# Patient Record
Sex: FEMALE | Race: WHITE | NOT HISPANIC OR LATINO
[De-identification: names, ages, dates, MRNs, and addresses within clinical notes are randomized per-mention and may not be internally consistent; named-entity substitution may affect disease eponyms.]

---

## 2017-08-29 ENCOUNTER — OTHER (OUTPATIENT)
Age: 57
End: 2017-08-29

## 2017-09-05 ENCOUNTER — APPOINTMENT (OUTPATIENT)
Dept: ORTHOPEDIC SURGERY | Facility: CLINIC | Age: 57
End: 2017-09-05
Payer: COMMERCIAL

## 2017-09-05 VITALS
DIASTOLIC BLOOD PRESSURE: 77 MMHG | HEART RATE: 54 BPM | HEIGHT: 69 IN | BODY MASS INDEX: 28.14 KG/M2 | SYSTOLIC BLOOD PRESSURE: 114 MMHG | WEIGHT: 190 LBS

## 2017-09-05 DIAGNOSIS — Z87.39 PERSONAL HISTORY OF OTHER DISEASES OF THE MUSCULOSKELETAL SYSTEM AND CONNECTIVE TISSUE: ICD-10-CM

## 2017-09-05 DIAGNOSIS — Z82.61 FAMILY HISTORY OF ARTHRITIS: ICD-10-CM

## 2017-09-05 DIAGNOSIS — M23.92 UNSPECIFIED INTERNAL DERANGEMENT OF LEFT KNEE: ICD-10-CM

## 2017-09-05 DIAGNOSIS — Z78.9 OTHER SPECIFIED HEALTH STATUS: ICD-10-CM

## 2017-09-05 DIAGNOSIS — Z82.62 FAMILY HISTORY OF OSTEOPOROSIS: ICD-10-CM

## 2017-09-05 DIAGNOSIS — Z86.39 PERSONAL HISTORY OF OTHER ENDOCRINE, NUTRITIONAL AND METABOLIC DISEASE: ICD-10-CM

## 2017-09-05 PROCEDURE — 73564 X-RAY EXAM KNEE 4 OR MORE: CPT | Mod: LT

## 2017-09-05 PROCEDURE — 99204 OFFICE O/P NEW MOD 45 MIN: CPT

## 2017-09-06 ENCOUNTER — OTHER (OUTPATIENT)
Age: 57
End: 2017-09-06

## 2017-09-12 ENCOUNTER — APPOINTMENT (OUTPATIENT)
Dept: ORTHOPEDIC SURGERY | Facility: CLINIC | Age: 57
End: 2017-09-12
Payer: COMMERCIAL

## 2017-09-12 VITALS
DIASTOLIC BLOOD PRESSURE: 71 MMHG | SYSTOLIC BLOOD PRESSURE: 103 MMHG | HEART RATE: 57 BPM | BODY MASS INDEX: 28.14 KG/M2 | WEIGHT: 190 LBS | HEIGHT: 69 IN

## 2017-09-12 PROCEDURE — 99213 OFFICE O/P EST LOW 20 MIN: CPT

## 2017-12-04 ENCOUNTER — APPOINTMENT (OUTPATIENT)
Dept: ORTHOPEDIC SURGERY | Facility: CLINIC | Age: 57
End: 2017-12-04
Payer: COMMERCIAL

## 2017-12-04 VITALS
WEIGHT: 190 LBS | HEIGHT: 69 IN | SYSTOLIC BLOOD PRESSURE: 107 MMHG | HEART RATE: 64 BPM | BODY MASS INDEX: 28.14 KG/M2 | DIASTOLIC BLOOD PRESSURE: 72 MMHG

## 2017-12-04 PROCEDURE — 20610 DRAIN/INJ JOINT/BURSA W/O US: CPT | Mod: LT

## 2017-12-04 PROCEDURE — 99213 OFFICE O/P EST LOW 20 MIN: CPT | Mod: 25

## 2017-12-04 RX ORDER — HYALURONATE SODIUM 20 MG/2 ML
20 SYRINGE (ML) INTRAARTICULAR
Qty: 6 | Refills: 0 | Status: DISCONTINUED | OUTPATIENT
Start: 2017-09-12 | End: 2017-12-04

## 2017-12-04 RX ORDER — NAPROXEN 500 MG/1
500 TABLET ORAL
Qty: 60 | Refills: 0 | Status: DISCONTINUED | COMMUNITY
Start: 2017-08-08 | End: 2017-12-04

## 2017-12-04 RX ORDER — CLARITHROMYCIN 500 MG/1
500 TABLET, FILM COATED ORAL
Qty: 20 | Refills: 0 | Status: DISCONTINUED | COMMUNITY
Start: 2017-04-21 | End: 2017-12-04

## 2018-02-06 ENCOUNTER — OTHER (OUTPATIENT)
Age: 58
End: 2018-02-06

## 2018-02-12 ENCOUNTER — APPOINTMENT (OUTPATIENT)
Dept: ORTHOPEDIC SURGERY | Facility: CLINIC | Age: 58
End: 2018-02-12
Payer: COMMERCIAL

## 2018-02-12 ENCOUNTER — OTHER (OUTPATIENT)
Age: 58
End: 2018-02-12

## 2018-02-12 VITALS
SYSTOLIC BLOOD PRESSURE: 122 MMHG | BODY MASS INDEX: 28.14 KG/M2 | HEIGHT: 69 IN | HEART RATE: 63 BPM | DIASTOLIC BLOOD PRESSURE: 79 MMHG | WEIGHT: 190 LBS

## 2018-02-12 DIAGNOSIS — M25.569 PAIN IN UNSPECIFIED KNEE: ICD-10-CM

## 2018-02-12 PROCEDURE — 73564 X-RAY EXAM KNEE 4 OR MORE: CPT | Mod: LT

## 2018-02-12 PROCEDURE — 99213 OFFICE O/P EST LOW 20 MIN: CPT

## 2018-02-13 ENCOUNTER — FORM ENCOUNTER (OUTPATIENT)
Age: 58
End: 2018-02-13

## 2018-02-14 ENCOUNTER — OUTPATIENT (OUTPATIENT)
Dept: OUTPATIENT SERVICES | Facility: HOSPITAL | Age: 58
LOS: 1 days | End: 2018-02-14
Payer: COMMERCIAL

## 2018-02-14 ENCOUNTER — APPOINTMENT (OUTPATIENT)
Dept: MRI IMAGING | Facility: CLINIC | Age: 58
End: 2018-02-14
Payer: COMMERCIAL

## 2018-02-14 DIAGNOSIS — M23.92 UNSPECIFIED INTERNAL DERANGEMENT OF LEFT KNEE: ICD-10-CM

## 2018-02-14 PROCEDURE — 73721 MRI JNT OF LWR EXTRE W/O DYE: CPT

## 2018-02-14 PROCEDURE — 73721 MRI JNT OF LWR EXTRE W/O DYE: CPT | Mod: 26,LT

## 2018-02-27 ENCOUNTER — APPOINTMENT (OUTPATIENT)
Dept: ORTHOPEDIC SURGERY | Facility: CLINIC | Age: 58
End: 2018-02-27
Payer: COMMERCIAL

## 2018-02-27 VITALS
WEIGHT: 190 LBS | SYSTOLIC BLOOD PRESSURE: 124 MMHG | HEIGHT: 69 IN | HEART RATE: 65 BPM | BODY MASS INDEX: 28.14 KG/M2 | DIASTOLIC BLOOD PRESSURE: 76 MMHG

## 2018-02-27 DIAGNOSIS — M17.12 UNILATERAL PRIMARY OSTEOARTHRITIS, LEFT KNEE: ICD-10-CM

## 2018-02-27 PROCEDURE — 99215 OFFICE O/P EST HI 40 MIN: CPT

## 2018-06-19 ENCOUNTER — OUTPATIENT (OUTPATIENT)
Dept: OUTPATIENT SERVICES | Facility: HOSPITAL | Age: 58
LOS: 1 days | End: 2018-06-19
Payer: COMMERCIAL

## 2018-06-19 DIAGNOSIS — R00.1 BRADYCARDIA, UNSPECIFIED: ICD-10-CM

## 2018-06-19 PROCEDURE — 93018 CV STRESS TEST I&R ONLY: CPT

## 2018-06-19 PROCEDURE — 93016 CV STRESS TEST SUPVJ ONLY: CPT

## 2018-07-16 ENCOUNTER — OUTPATIENT (OUTPATIENT)
Dept: OUTPATIENT SERVICES | Facility: HOSPITAL | Age: 58
LOS: 1 days | End: 2018-07-16
Payer: COMMERCIAL

## 2018-07-16 DIAGNOSIS — R94.31 ABNORMAL ELECTROCARDIOGRAM [ECG] [EKG]: ICD-10-CM

## 2018-07-16 PROCEDURE — 93018 CV STRESS TEST I&R ONLY: CPT

## 2018-07-16 PROCEDURE — A9500: CPT

## 2018-07-16 PROCEDURE — 93016 CV STRESS TEST SUPVJ ONLY: CPT

## 2018-07-16 PROCEDURE — 93017 CV STRESS TEST TRACING ONLY: CPT

## 2018-07-16 PROCEDURE — 78452 HT MUSCLE IMAGE SPECT MULT: CPT | Mod: 26

## 2018-07-16 PROCEDURE — 78452 HT MUSCLE IMAGE SPECT MULT: CPT

## 2019-02-12 ENCOUNTER — APPOINTMENT (OUTPATIENT)
Dept: THORACIC SURGERY | Facility: CLINIC | Age: 59
End: 2019-02-12
Payer: COMMERCIAL

## 2019-02-12 VITALS
BODY MASS INDEX: 28.14 KG/M2 | RESPIRATION RATE: 16 BRPM | WEIGHT: 190 LBS | OXYGEN SATURATION: 96 % | HEART RATE: 56 BPM | DIASTOLIC BLOOD PRESSURE: 69 MMHG | SYSTOLIC BLOOD PRESSURE: 124 MMHG | HEIGHT: 69 IN

## 2019-02-12 DIAGNOSIS — Z86.79 PERSONAL HISTORY OF OTHER DISEASES OF THE CIRCULATORY SYSTEM: ICD-10-CM

## 2019-02-12 PROCEDURE — 99205 OFFICE O/P NEW HI 60 MIN: CPT

## 2019-02-13 PROBLEM — Z86.79 HISTORY OF HYPERTENSION: Status: RESOLVED | Noted: 2019-02-13 | Resolved: 2019-02-13

## 2019-03-11 ENCOUNTER — OTHER (OUTPATIENT)
Age: 59
End: 2019-03-11

## 2019-03-13 ENCOUNTER — APPOINTMENT (OUTPATIENT)
Dept: GASTROENTEROLOGY | Facility: HOSPITAL | Age: 59
End: 2019-03-13

## 2019-03-13 ENCOUNTER — OUTPATIENT (OUTPATIENT)
Dept: OUTPATIENT SERVICES | Facility: HOSPITAL | Age: 59
LOS: 1 days | Discharge: HOME | End: 2019-03-13
Payer: COMMERCIAL

## 2019-03-13 DIAGNOSIS — K44.9 DIAPHRAGMATIC HERNIA WITHOUT OBSTRUCTION OR GANGRENE: ICD-10-CM

## 2019-03-13 PROCEDURE — 91037 ESOPH IMPED FUNCTION TEST: CPT | Mod: 26,GC

## 2019-03-13 PROCEDURE — 91010 ESOPHAGUS MOTILITY STUDY: CPT | Mod: 26,GC

## 2019-03-24 ENCOUNTER — FORM ENCOUNTER (OUTPATIENT)
Age: 59
End: 2019-03-24

## 2019-03-25 ENCOUNTER — OUTPATIENT (OUTPATIENT)
Dept: OUTPATIENT SERVICES | Facility: HOSPITAL | Age: 59
LOS: 1 days | End: 2019-03-25
Payer: COMMERCIAL

## 2019-03-25 ENCOUNTER — OUTPATIENT (OUTPATIENT)
Dept: OUTPATIENT SERVICES | Facility: HOSPITAL | Age: 59
LOS: 1 days | End: 2019-03-25

## 2019-03-25 ENCOUNTER — APPOINTMENT (OUTPATIENT)
Dept: RADIOLOGY | Facility: HOSPITAL | Age: 59
End: 2019-03-25
Payer: COMMERCIAL

## 2019-03-25 ENCOUNTER — APPOINTMENT (OUTPATIENT)
Dept: CT IMAGING | Facility: IMAGING CENTER | Age: 59
End: 2019-03-25

## 2019-03-25 VITALS
DIASTOLIC BLOOD PRESSURE: 70 MMHG | TEMPERATURE: 98 F | SYSTOLIC BLOOD PRESSURE: 126 MMHG | RESPIRATION RATE: 18 BRPM | HEART RATE: 62 BPM | HEIGHT: 67 IN | WEIGHT: 201.06 LBS

## 2019-03-25 DIAGNOSIS — Z98.890 OTHER SPECIFIED POSTPROCEDURAL STATES: Chronic | ICD-10-CM

## 2019-03-25 DIAGNOSIS — K44.9 DIAPHRAGMATIC HERNIA WITHOUT OBSTRUCTION OR GANGRENE: ICD-10-CM

## 2019-03-25 DIAGNOSIS — Z90.49 ACQUIRED ABSENCE OF OTHER SPECIFIED PARTS OF DIGESTIVE TRACT: Chronic | ICD-10-CM

## 2019-03-25 DIAGNOSIS — K21.9 GASTRO-ESOPHAGEAL REFLUX DISEASE WITHOUT ESOPHAGITIS: Chronic | ICD-10-CM

## 2019-03-25 LAB
ANION GAP SERPL CALC-SCNC: 13 MMO/L — SIGNIFICANT CHANGE UP (ref 7–14)
BLD GP AB SCN SERPL QL: NEGATIVE — SIGNIFICANT CHANGE UP
BUN SERPL-MCNC: 14 MG/DL — SIGNIFICANT CHANGE UP (ref 7–23)
CALCIUM SERPL-MCNC: 9.3 MG/DL — SIGNIFICANT CHANGE UP (ref 8.4–10.5)
CHLORIDE SERPL-SCNC: 105 MMOL/L — SIGNIFICANT CHANGE UP (ref 98–107)
CO2 SERPL-SCNC: 24 MMOL/L — SIGNIFICANT CHANGE UP (ref 22–31)
CREAT SERPL-MCNC: 0.71 MG/DL — SIGNIFICANT CHANGE UP (ref 0.5–1.3)
GLUCOSE SERPL-MCNC: 110 MG/DL — HIGH (ref 70–99)
HCT VFR BLD CALC: 39.2 % — SIGNIFICANT CHANGE UP (ref 34.5–45)
HGB BLD-MCNC: 12.9 G/DL — SIGNIFICANT CHANGE UP (ref 11.5–15.5)
MCHC RBC-ENTMCNC: 30.1 PG — SIGNIFICANT CHANGE UP (ref 27–34)
MCHC RBC-ENTMCNC: 32.9 % — SIGNIFICANT CHANGE UP (ref 32–36)
MCV RBC AUTO: 91.6 FL — SIGNIFICANT CHANGE UP (ref 80–100)
NRBC # FLD: 0 K/UL — SIGNIFICANT CHANGE UP (ref 0–0)
PLATELET # BLD AUTO: 287 K/UL — SIGNIFICANT CHANGE UP (ref 150–400)
PMV BLD: 11.6 FL — SIGNIFICANT CHANGE UP (ref 7–13)
POTASSIUM SERPL-MCNC: 3.8 MMOL/L — SIGNIFICANT CHANGE UP (ref 3.5–5.3)
POTASSIUM SERPL-SCNC: 3.8 MMOL/L — SIGNIFICANT CHANGE UP (ref 3.5–5.3)
RBC # BLD: 4.28 M/UL — SIGNIFICANT CHANGE UP (ref 3.8–5.2)
RBC # FLD: 13 % — SIGNIFICANT CHANGE UP (ref 10.3–14.5)
RH IG SCN BLD-IMP: POSITIVE — SIGNIFICANT CHANGE UP
SODIUM SERPL-SCNC: 142 MMOL/L — SIGNIFICANT CHANGE UP (ref 135–145)
WBC # BLD: 6.78 K/UL — SIGNIFICANT CHANGE UP (ref 3.8–10.5)
WBC # FLD AUTO: 6.78 K/UL — SIGNIFICANT CHANGE UP (ref 3.8–10.5)

## 2019-03-25 PROCEDURE — 71250 CT THORAX DX C-: CPT

## 2019-03-25 PROCEDURE — 71250 CT THORAX DX C-: CPT | Mod: 26

## 2019-03-25 PROCEDURE — 93010 ELECTROCARDIOGRAM REPORT: CPT

## 2019-03-25 PROCEDURE — 74220 X-RAY XM ESOPHAGUS 1CNTRST: CPT | Mod: 26

## 2019-03-25 RX ORDER — SODIUM CHLORIDE 9 MG/ML
3 INJECTION INTRAMUSCULAR; INTRAVENOUS; SUBCUTANEOUS EVERY 8 HOURS
Qty: 0 | Refills: 0 | Status: DISCONTINUED | OUTPATIENT
Start: 2019-04-08 | End: 2019-04-09

## 2019-03-25 NOTE — H&P PST ADULT - NEGATIVE CARDIOVASCULAR SYMPTOMS
no dyspnea on exertion/no palpitations/no claudication/no orthopnea/no peripheral edema/no paroxysmal nocturnal dyspnea/no chest pain

## 2019-03-25 NOTE — H&P PST ADULT - RS GEN PE MLT RESP DETAILS PC
no rales/no subcutaneous emphysema/breath sounds equal/good air movement/clear to auscultation bilaterally/respirations non-labored/airway patent/no intercostal retractions/no chest wall tenderness/no rhonchi/no wheezes

## 2019-03-25 NOTE — H&P PST ADULT - NSICDXPASTSURGICALHX_GEN_ALL_CORE_FT
PAST SURGICAL HISTORY:  H/O repair of right rotator cuff 2002    Hiatal hernia with GERD " Endoscopic lesion of stomach"; 03/05/19    History of appendectomy 1991    History of bunionectomy right; 2006    Status post wrist surgery right;  2003

## 2019-03-25 NOTE — H&P PST ADULT - NSANTHOSAYNRD_GEN_A_CORE
No. XAVI screening performed.  STOP BANG Legend: 0-2 = LOW Risk; 3-4 = INTERMEDIATE Risk; 5-8 = HIGH Risk

## 2019-03-25 NOTE — H&P PST ADULT - NSICDXPASTMEDICALHX_GEN_ALL_CORE_FT
PAST MEDICAL HISTORY:  Dyslipidemia     GERD (gastroesophageal reflux disease)     Hypertension     Hypothyroidism     Obesity     Osteopenia

## 2019-03-25 NOTE — H&P PST ADULT - NEGATIVE GASTROINTESTINAL SYMPTOMS
no change in bowel habits/no steatorrhea/no flatulence/no melena/no nausea/no vomiting/no constipation/no hiccoughs/no jaundice/no diarrhea

## 2019-03-25 NOTE — H&P PST ADULT - NEGATIVE BREAST SYMPTOMS
no breast tenderness L/no breast tenderness R/no nipple discharge L/no breast lump R/no breast lump L

## 2019-03-25 NOTE — H&P PST ADULT - HISTORY OF PRESENT ILLNESS
60 y/o  female with PMHx: Hypothyroidism, HTN, Dyslipidemia, Osteopenia presents to PST for pre op evaluation   h/o severe acid reflux, worsened x 1 1/2 year. S/P Endoscopy 01/14/2019 "small tumor and lesion". Scheduled for upper endosopy, Robotic assisted laparoscopic hiatal hernia repair, Nissen fundoplication on 04/08/19 60 y/o  female with PMHx: Hypothyroidism, HTN, Dyslipidemia, Osteopenia, Obesity presents to PST for pre op evaluation with h/o severe acid reflux, worsened x 1 1/2 year. S/P Endoscopy 01/14/2019 "small tumor and lesion". Scheduled for upper endoscopy, Robotic assisted laparoscopic hiatal hernia repair, Nissen fundoplication on 04/08/19

## 2019-03-25 NOTE — H&P PST ADULT - NEGATIVE OPHTHALMOLOGIC SYMPTOMS
no blurred vision L/no blurred vision R/no discharge R/no pain L/no irritation L/no loss of vision R/no discharge L/no lacrimation L/no lacrimation R/no diplopia/no photophobia/no irritation R/no loss of vision L

## 2019-03-25 NOTE — H&P PST ADULT - NSICDXPROBLEM_GEN_ALL_CORE_FT
PROBLEM DIAGNOSES  Problem: Diaphragmatic hernia without obstruction or gangrene  Assessment and Plan: Scheduled for upper endoscopy, Robotic assisted laparoscopic hiatal hernia repair, Nissen fundoplication on 04/08/19. Pre op instructions, chlorhexidine gluconate soap given and explained. Pt verbalized understanding.

## 2019-04-07 ENCOUNTER — TRANSCRIPTION ENCOUNTER (OUTPATIENT)
Age: 59
End: 2019-04-07

## 2019-04-08 ENCOUNTER — RESULT REVIEW (OUTPATIENT)
Age: 59
End: 2019-04-08

## 2019-04-08 ENCOUNTER — INPATIENT (INPATIENT)
Facility: HOSPITAL | Age: 59
LOS: 0 days | Discharge: HOME | End: 2019-04-09
Attending: THORACIC SURGERY (CARDIOTHORACIC VASCULAR SURGERY) | Admitting: THORACIC SURGERY (CARDIOTHORACIC VASCULAR SURGERY)
Payer: COMMERCIAL

## 2019-04-08 ENCOUNTER — APPOINTMENT (OUTPATIENT)
Dept: THORACIC SURGERY | Facility: HOSPITAL | Age: 59
End: 2019-04-08

## 2019-04-08 ENCOUNTER — TRANSCRIPTION ENCOUNTER (OUTPATIENT)
Age: 59
End: 2019-04-08

## 2019-04-08 VITALS
SYSTOLIC BLOOD PRESSURE: 99 MMHG | HEART RATE: 98 BPM | RESPIRATION RATE: 16 BRPM | WEIGHT: 201.06 LBS | HEIGHT: 67 IN | TEMPERATURE: 98 F | OXYGEN SATURATION: 96 % | DIASTOLIC BLOOD PRESSURE: 65 MMHG

## 2019-04-08 DIAGNOSIS — Z98.890 OTHER SPECIFIED POSTPROCEDURAL STATES: Chronic | ICD-10-CM

## 2019-04-08 DIAGNOSIS — Z90.49 ACQUIRED ABSENCE OF OTHER SPECIFIED PARTS OF DIGESTIVE TRACT: Chronic | ICD-10-CM

## 2019-04-08 DIAGNOSIS — K21.9 GASTRO-ESOPHAGEAL REFLUX DISEASE WITHOUT ESOPHAGITIS: Chronic | ICD-10-CM

## 2019-04-08 DIAGNOSIS — K44.9 DIAPHRAGMATIC HERNIA WITHOUT OBSTRUCTION OR GANGRENE: ICD-10-CM

## 2019-04-08 LAB — RH IG SCN BLD-IMP: POSITIVE — SIGNIFICANT CHANGE UP

## 2019-04-08 PROCEDURE — 71045 X-RAY EXAM CHEST 1 VIEW: CPT | Mod: 26

## 2019-04-08 PROCEDURE — 88302 TISSUE EXAM BY PATHOLOGIST: CPT | Mod: 26

## 2019-04-08 RX ORDER — ONDANSETRON 8 MG/1
4 TABLET, FILM COATED ORAL ONCE
Qty: 0 | Refills: 0 | Status: DISCONTINUED | OUTPATIENT
Start: 2019-04-08 | End: 2019-04-08

## 2019-04-08 RX ORDER — NALBUPHINE HYDROCHLORIDE 10 MG/ML
2.5 INJECTION, SOLUTION INTRAMUSCULAR; INTRAVENOUS; SUBCUTANEOUS EVERY 6 HOURS
Qty: 0 | Refills: 0 | Status: DISCONTINUED | OUTPATIENT
Start: 2019-04-08 | End: 2019-04-09

## 2019-04-08 RX ORDER — SODIUM CHLORIDE 9 MG/ML
1000 INJECTION, SOLUTION INTRAVENOUS
Qty: 0 | Refills: 0 | Status: DISCONTINUED | OUTPATIENT
Start: 2019-04-08 | End: 2019-04-09

## 2019-04-08 RX ORDER — HYDROMORPHONE HYDROCHLORIDE 2 MG/ML
0.5 INJECTION INTRAMUSCULAR; INTRAVENOUS; SUBCUTANEOUS
Qty: 0 | Refills: 0 | Status: DISCONTINUED | OUTPATIENT
Start: 2019-04-08 | End: 2019-04-08

## 2019-04-08 RX ORDER — ONDANSETRON 8 MG/1
4 TABLET, FILM COATED ORAL EVERY 6 HOURS
Qty: 0 | Refills: 0 | Status: DISCONTINUED | OUTPATIENT
Start: 2019-04-08 | End: 2019-04-09

## 2019-04-08 RX ORDER — HYDROMORPHONE HYDROCHLORIDE 2 MG/ML
30 INJECTION INTRAMUSCULAR; INTRAVENOUS; SUBCUTANEOUS
Qty: 0 | Refills: 0 | Status: DISCONTINUED | OUTPATIENT
Start: 2019-04-08 | End: 2019-04-09

## 2019-04-08 RX ORDER — SODIUM CHLORIDE 9 MG/ML
1000 INJECTION, SOLUTION INTRAVENOUS
Qty: 0 | Refills: 0 | Status: DISCONTINUED | OUTPATIENT
Start: 2019-04-08 | End: 2019-04-08

## 2019-04-08 RX ORDER — NALOXONE HYDROCHLORIDE 4 MG/.1ML
0.1 SPRAY NASAL
Qty: 0 | Refills: 0 | Status: DISCONTINUED | OUTPATIENT
Start: 2019-04-08 | End: 2019-04-09

## 2019-04-08 RX ORDER — HYDROMORPHONE HYDROCHLORIDE 2 MG/ML
1 INJECTION INTRAMUSCULAR; INTRAVENOUS; SUBCUTANEOUS
Qty: 0 | Refills: 0 | Status: DISCONTINUED | OUTPATIENT
Start: 2019-04-08 | End: 2019-04-08

## 2019-04-08 RX ORDER — HYDROMORPHONE HYDROCHLORIDE 2 MG/ML
0.5 INJECTION INTRAMUSCULAR; INTRAVENOUS; SUBCUTANEOUS
Qty: 0 | Refills: 0 | Status: DISCONTINUED | OUTPATIENT
Start: 2019-04-08 | End: 2019-04-09

## 2019-04-08 RX ORDER — HEPARIN SODIUM 5000 [USP'U]/ML
5000 INJECTION INTRAVENOUS; SUBCUTANEOUS EVERY 8 HOURS
Qty: 0 | Refills: 0 | Status: DISCONTINUED | OUTPATIENT
Start: 2019-04-08 | End: 2019-04-09

## 2019-04-08 RX ORDER — HEPARIN SODIUM 5000 [USP'U]/ML
5000 INJECTION INTRAVENOUS; SUBCUTANEOUS ONCE
Qty: 0 | Refills: 0 | Status: COMPLETED | OUTPATIENT
Start: 2019-04-08 | End: 2019-04-08

## 2019-04-08 RX ADMIN — HYDROMORPHONE HYDROCHLORIDE 30 MILLILITER(S): 2 INJECTION INTRAMUSCULAR; INTRAVENOUS; SUBCUTANEOUS at 15:00

## 2019-04-08 RX ADMIN — HYDROMORPHONE HYDROCHLORIDE 30 MILLILITER(S): 2 INJECTION INTRAMUSCULAR; INTRAVENOUS; SUBCUTANEOUS at 17:31

## 2019-04-08 RX ADMIN — HEPARIN SODIUM 5000 UNIT(S): 5000 INJECTION INTRAVENOUS; SUBCUTANEOUS at 19:06

## 2019-04-08 RX ADMIN — SODIUM CHLORIDE 3 MILLILITER(S): 9 INJECTION INTRAMUSCULAR; INTRAVENOUS; SUBCUTANEOUS at 21:00

## 2019-04-08 RX ADMIN — SODIUM CHLORIDE 30 MILLILITER(S): 9 INJECTION, SOLUTION INTRAVENOUS at 10:21

## 2019-04-08 RX ADMIN — HEPARIN SODIUM 5000 UNIT(S): 5000 INJECTION INTRAVENOUS; SUBCUTANEOUS at 10:20

## 2019-04-08 RX ADMIN — HYDROMORPHONE HYDROCHLORIDE 30 MILLILITER(S): 2 INJECTION INTRAMUSCULAR; INTRAVENOUS; SUBCUTANEOUS at 19:06

## 2019-04-08 NOTE — BRIEF OPERATIVE NOTE - NSICDXBRIEFPROCEDURE_GEN_ALL_CORE_FT
PROCEDURES:  Robot-assisted repair of hiatal hernia with Nissen fundoplication using daVinci XI 08-Apr-2019 13:47:14  Marcos Bah

## 2019-04-08 NOTE — REVIEW OF SYSTEMS
[As Noted in HPI] : as noted in HPI [Abdominal Pain] : abdominal pain [Heartburn] : heartburn [Negative] : Heme/Lymph [Vomiting] : no vomiting [Constipation] : no constipation [Diarrhea] : no diarrhea

## 2019-04-08 NOTE — HISTORY OF PRESENT ILLNESS
[FreeTextEntry1] : 59 y/o female, former smoker, w/ hx of reflux, HTN, who presented to Dr. Luis Carlos Castle for severe acid reflux. \par \par EGD on 1/14/19 showed a 3 cm hiatal hernia and a small-medium sized submucosal lesion in the gastric body along the greater curvature, on EUS, it was a 12.2 x 7.6mm hypoechoic and homogenous layer IV lesion, suggestive of a GIST (no bx done). \par \par Pt is here today for CT Sx consultation referred by Dr. Luis Carlos Castle. Pt admits to acid reflux, epigastric pain,  mild dysphagia, and food regurgitation but denies nausea/vomiting, weight loss, cough, SOB or CP. Patient also reports removal of GIST with Dr. Jesus Manuel Richard scheduled on 3/5/19.

## 2019-04-08 NOTE — DATA REVIEWED
[FreeTextEntry1] : EGD on 1/14/19 showed a 3 cm hiatal hernia and a small-medium sized submucosal lesion in the gastric body along the greater curvature, on EUS, it was a 12.2 x 7.6mm hypoechoic and homogenous layer IV lesion, suggestive of a GIST (no bx done).

## 2019-04-08 NOTE — CONSULT LETTER
[Dear  ___] : Dear  [unfilled], [Consult Letter:] : I had the pleasure of evaluating your patient, [unfilled]. [( Thank you for referring [unfilled] for consultation for _____ )] : Thank you for referring [unfilled] for consultation for [unfilled] [Please see my note below.] : Please see my note below. [Consult Closing:] : Thank you very much for allowing me to participate in the care of this patient.  If you have any questions, please do not hesitate to contact me. [Sincerely,] : Sincerely, [FreeTextEntry2] : Dr. Luis Carlos Castle (Ref) [FreeTextEntry3] : Rainer Simon MD, MPH \par System Director of Thoracic Surgery \par Director of Comprehensive Lung and Foregut [place] \par Professor Cardiovascular & Thoracic Surgery  \par [facility] [employer] [facility]\par

## 2019-04-08 NOTE — ASSESSMENT
[FreeTextEntry1] : 59 y/o female, former smoker, w/ hx of reflux, HTN, who presented to Dr. Luis Carlos Castle for severe acid reflux. \par \par EGD on 1/14/19 showed a 3 cm hiatal hernia and a small-medium sized submucosal lesion in the gastric body along the greater curvature, on EUS, it was a 12.2 x 7.6mm hypoechoic and homogenous layer IV lesion, suggestive of a GIST (no bx done). \par \par I have reviewed the patient's medical records and diagnostic images at time of this office consultation and have made the following recommendation:\par 1. EGD result reviewed with pt. Pt is symptomatic, I recommended EGD, laparoscopic, Robotic-assisted, hiatal hernia repair on 4/8/19. Risks and benefits and alternatives explained to patient, all questions answered, patient agreed to proceed with surgery.\par 2. Medical clearance and PST\par 3. Barium esophagram\par 4. Manometry by Dr. Lisa Song\par 5. CT chest w/o contrast to evaluate size of the hernia \par \par Written by Joceline Milian NP, acting as a scribe for Dr. Rainer Simon. \par \par The documentation recorded by the scribe accurately reflects the service I personally performed and the decisions made by me. RAINER SIMON MD\par \par ADDENDUM (REGARDING EGD FINDINGS ON 1/14/19) MADE BY ALAN MORRIS NP ON 4/8/19 AS PER DR. RAINER SIMON.\par \par \par \par

## 2019-04-09 ENCOUNTER — TRANSCRIPTION ENCOUNTER (OUTPATIENT)
Age: 59
End: 2019-04-09

## 2019-04-09 VITALS
HEART RATE: 54 BPM | OXYGEN SATURATION: 98 % | TEMPERATURE: 98 F | DIASTOLIC BLOOD PRESSURE: 70 MMHG | RESPIRATION RATE: 18 BRPM | SYSTOLIC BLOOD PRESSURE: 128 MMHG

## 2019-04-09 LAB
ANION GAP SERPL CALC-SCNC: 10 MMO/L — SIGNIFICANT CHANGE UP (ref 7–14)
BASOPHILS # BLD AUTO: 0.01 K/UL — SIGNIFICANT CHANGE UP (ref 0–0.2)
BASOPHILS NFR BLD AUTO: 0.1 % — SIGNIFICANT CHANGE UP (ref 0–2)
BUN SERPL-MCNC: 13 MG/DL — SIGNIFICANT CHANGE UP (ref 7–23)
CALCIUM SERPL-MCNC: 8.6 MG/DL — SIGNIFICANT CHANGE UP (ref 8.4–10.5)
CHLORIDE SERPL-SCNC: 104 MMOL/L — SIGNIFICANT CHANGE UP (ref 98–107)
CO2 SERPL-SCNC: 24 MMOL/L — SIGNIFICANT CHANGE UP (ref 22–31)
CREAT SERPL-MCNC: 0.7 MG/DL — SIGNIFICANT CHANGE UP (ref 0.5–1.3)
EOSINOPHIL # BLD AUTO: 0 K/UL — SIGNIFICANT CHANGE UP (ref 0–0.5)
EOSINOPHIL NFR BLD AUTO: 0 % — SIGNIFICANT CHANGE UP (ref 0–6)
GLUCOSE SERPL-MCNC: 120 MG/DL — HIGH (ref 70–99)
HCT VFR BLD CALC: 36.7 % — SIGNIFICANT CHANGE UP (ref 34.5–45)
HGB BLD-MCNC: 11.8 G/DL — SIGNIFICANT CHANGE UP (ref 11.5–15.5)
IMM GRANULOCYTES NFR BLD AUTO: 0.4 % — SIGNIFICANT CHANGE UP (ref 0–1.5)
LYMPHOCYTES # BLD AUTO: 1.77 K/UL — SIGNIFICANT CHANGE UP (ref 1–3.3)
LYMPHOCYTES # BLD AUTO: 19.2 % — SIGNIFICANT CHANGE UP (ref 13–44)
MCHC RBC-ENTMCNC: 29.7 PG — SIGNIFICANT CHANGE UP (ref 27–34)
MCHC RBC-ENTMCNC: 32.2 % — SIGNIFICANT CHANGE UP (ref 32–36)
MCV RBC AUTO: 92.4 FL — SIGNIFICANT CHANGE UP (ref 80–100)
MONOCYTES # BLD AUTO: 0.77 K/UL — SIGNIFICANT CHANGE UP (ref 0–0.9)
MONOCYTES NFR BLD AUTO: 8.3 % — SIGNIFICANT CHANGE UP (ref 2–14)
NEUTROPHILS # BLD AUTO: 6.65 K/UL — SIGNIFICANT CHANGE UP (ref 1.8–7.4)
NEUTROPHILS NFR BLD AUTO: 72 % — SIGNIFICANT CHANGE UP (ref 43–77)
NRBC # FLD: 0 K/UL — SIGNIFICANT CHANGE UP (ref 0–0)
PLATELET # BLD AUTO: 191 K/UL — SIGNIFICANT CHANGE UP (ref 150–400)
PMV BLD: 11.5 FL — SIGNIFICANT CHANGE UP (ref 7–13)
POTASSIUM SERPL-MCNC: 3.9 MMOL/L — SIGNIFICANT CHANGE UP (ref 3.5–5.3)
POTASSIUM SERPL-SCNC: 3.9 MMOL/L — SIGNIFICANT CHANGE UP (ref 3.5–5.3)
RBC # BLD: 3.97 M/UL — SIGNIFICANT CHANGE UP (ref 3.8–5.2)
RBC # FLD: 13.2 % — SIGNIFICANT CHANGE UP (ref 10.3–14.5)
SODIUM SERPL-SCNC: 138 MMOL/L — SIGNIFICANT CHANGE UP (ref 135–145)
WBC # BLD: 9.24 K/UL — SIGNIFICANT CHANGE UP (ref 3.8–10.5)
WBC # FLD AUTO: 9.24 K/UL — SIGNIFICANT CHANGE UP (ref 3.8–10.5)

## 2019-04-09 PROCEDURE — 74220 X-RAY XM ESOPHAGUS 1CNTRST: CPT | Mod: 26

## 2019-04-09 PROCEDURE — 99238 HOSP IP/OBS DSCHRG MGMT 30/<: CPT

## 2019-04-09 PROCEDURE — 71045 X-RAY EXAM CHEST 1 VIEW: CPT | Mod: 26

## 2019-04-09 RX ORDER — ACETAMINOPHEN 500 MG
650 TABLET ORAL EVERY 6 HOURS
Qty: 0 | Refills: 0 | Status: DISCONTINUED | OUTPATIENT
Start: 2019-04-09 | End: 2019-04-09

## 2019-04-09 RX ORDER — OXYCODONE HYDROCHLORIDE 5 MG/1
5 TABLET ORAL EVERY 4 HOURS
Qty: 0 | Refills: 0 | Status: DISCONTINUED | OUTPATIENT
Start: 2019-04-09 | End: 2019-04-09

## 2019-04-09 RX ORDER — ACETAMINOPHEN 500 MG
1000 TABLET ORAL ONCE
Qty: 0 | Refills: 0 | Status: DISCONTINUED | OUTPATIENT
Start: 2019-04-09 | End: 2019-04-09

## 2019-04-09 RX ORDER — RANITIDINE HYDROCHLORIDE 150 MG/1
1 TABLET, FILM COATED ORAL
Qty: 0 | Refills: 0 | COMMUNITY

## 2019-04-09 RX ORDER — SIMETHICONE 80 MG/1
1 TABLET, CHEWABLE ORAL
Qty: 0 | Refills: 0 | DISCHARGE
Start: 2019-04-09

## 2019-04-09 RX ORDER — DOCUSATE SODIUM 100 MG
100 CAPSULE ORAL THREE TIMES A DAY
Qty: 0 | Refills: 0 | Status: DISCONTINUED | OUTPATIENT
Start: 2019-04-09 | End: 2019-04-09

## 2019-04-09 RX ORDER — SIMETHICONE 80 MG/1
80 TABLET, CHEWABLE ORAL DAILY
Qty: 0 | Refills: 0 | Status: DISCONTINUED | OUTPATIENT
Start: 2019-04-09 | End: 2019-04-09

## 2019-04-09 RX ORDER — OMEPRAZOLE 10 MG/1
1 CAPSULE, DELAYED RELEASE ORAL
Qty: 0 | Refills: 0 | COMMUNITY

## 2019-04-09 RX ORDER — ACETAMINOPHEN 500 MG
2 TABLET ORAL
Qty: 0 | Refills: 0 | DISCHARGE
Start: 2019-04-09

## 2019-04-09 RX ORDER — OXYCODONE HYDROCHLORIDE 5 MG/1
1 TABLET ORAL
Qty: 16 | Refills: 0
Start: 2019-04-09 | End: 2019-04-12

## 2019-04-09 RX ADMIN — HYDROMORPHONE HYDROCHLORIDE 30 MILLILITER(S): 2 INJECTION INTRAMUSCULAR; INTRAVENOUS; SUBCUTANEOUS at 07:13

## 2019-04-09 RX ADMIN — HEPARIN SODIUM 5000 UNIT(S): 5000 INJECTION INTRAVENOUS; SUBCUTANEOUS at 03:19

## 2019-04-09 RX ADMIN — HEPARIN SODIUM 5000 UNIT(S): 5000 INJECTION INTRAVENOUS; SUBCUTANEOUS at 10:00

## 2019-04-09 RX ADMIN — SODIUM CHLORIDE 3 MILLILITER(S): 9 INJECTION INTRAMUSCULAR; INTRAVENOUS; SUBCUTANEOUS at 13:04

## 2019-04-09 RX ADMIN — SODIUM CHLORIDE 3 MILLILITER(S): 9 INJECTION INTRAMUSCULAR; INTRAVENOUS; SUBCUTANEOUS at 05:00

## 2019-04-09 RX ADMIN — ONDANSETRON 4 MILLIGRAM(S): 8 TABLET, FILM COATED ORAL at 10:00

## 2019-04-09 NOTE — DISCHARGE NOTE PROVIDER - CARE PROVIDER_API CALL
Rainer Simon (MD)  Surgery; Thoracic Surgery  [address] [place] [address] [place] [address]  Phone: [phone]  Fax: [phone]  Follow Up Time:     Garth Costa  Phone: [phone]  Fax: (   )    -  Follow Up Time:

## 2019-04-09 NOTE — DISCHARGE NOTE PROVIDER - NSDCFUADDAPPT_GEN_ALL_CORE_FT
See Dr Simon in 2 weeks- call for an appointment. Bring a new Chest X-ray when you come.  See your PCP as well.  Keep wounds clean and dry and monitor for redness, pus, fever.  If noted, call Dr Simon

## 2019-04-09 NOTE — PROVIDER CONTACT NOTE (OTHER) - ASSESSMENT
Pt. A&Ox4.  Asymptomatic.  /64.  Able to ambulate with assistance.  No c/o of dizziness or lightheadedness.

## 2019-04-09 NOTE — DISCHARGE NOTE PROVIDER - NSDCCPTREATMENT_GEN_ALL_CORE_FT
PRINCIPAL PROCEDURE  Procedure: Robot-assisted repair of hiatal hernia with Nissen fundoplication using daVinci XI  Findings and Treatment:

## 2019-04-09 NOTE — DISCHARGE NOTE PROVIDER - HOSPITAL COURSE
60 y/o female with PMHx: Hypothyroidism, HTN, Dyslipidemia, Osteopenia, Obesity and severe acid reflux was diagnosed with a hiatal hernia and underwent a robotic assisted laparoscopic hiatal hernia repair, Nissen fundoplication on 04/8/19.  She was for discharge home after passing her Barium swallow and then tolerating her diet. 58 y/o female with PMHx: Hypothyroidism, HTN, Dyslipidemia, Osteopenia, Obesity and severe acid reflux was diagnosed with a hiatal hernia and underwent a robotic assisted laparoscopic hiatal hernia repair, Nissen fundoplication on 04/8/19.  On POD 1 after passing her Barium swallow she was started on a full liquid diet which she tolerated well. She was discharged home with diet instructions and follow up per Dr. Simon.

## 2019-04-09 NOTE — PROGRESS NOTE ADULT - SUBJECTIVE AND OBJECTIVE BOX
Subjective: 58 y/o female presents sitting up in bed in NAD. States "I feel okay, had a bowel movement this morning already"    Vital Signs:  Vital Signs Last 24 Hrs  T(C): 36.9 (04-09-19 @ 08:40), Max: 37.2 (04-08-19 @ 15:30)  T(F): 98.4 (04-09-19 @ 08:40), Max: 99 (04-08-19 @ 15:30)  HR: 51 (04-09-19 @ 08:40) (51 - 80)  BP: 126/64 (04-09-19 @ 08:40) (114/66 - 133/64)  RR: 18 (04-09-19 @ 04:19) (11 - 18)  SpO2: 95% (04-09-19 @ 08:40) (94% - 100%) on (O2)    Pertinent Physical Exam:  Telemetry/Alarms: Sinus nelson  General: WN/WD NAD  Neurology: Awake, nonfocal, LOZANO x 4  Eyes: Scleras clear, PERRLA/ EOMI, Gross vision intact  ENT:Gross hearing intact, grossly patent pharynx, no stridor  Neck: Neck supple, trachea midline, No JVD,   Respiratory: CTA B/L, No wheezing, rales, rhonchi  CV: RRR, S1S2, no murmurs, rubs or gallops  Abdominal: Soft, NT, ND +BS,   Extremities: No edema, + peripheral pulses  Skin: No Rashes, Hematoma, Ecchymosis  Lymphatic: No Neck, axilla, groin LAD  Psych: Oriented x 3, normal affect  Incisions: c/d/i    I&O's Summary    08 Apr 2019 07:01  -  09 Apr 2019 07:00  --------------------------------------------------------  IN: 1500 mL / OUT: 1500 mL / NET: 0 mL        Relevant labs, radiology and Medications reviewed      CXR:   < from: Xray Chest 1 View- PORTABLE-Urgent (04.08.19 @ 14:40) >    INTERPRETATION:  Subcutaenous emphysema compatible with patient's recent   hx of Nissen fundoplication.      ******PRELIMINARY REPORT******    ******PRELIMINARY REPORT******          NAUN LEIVA M.D., RADIOLOGY RESIDENT        < end of copied text >                        11.8   9.24  )-----------( 191      ( 09 Apr 2019 05:57 )             36.7     04-09    138  |  104  |  13  ----------------------------<  120<H>  3.9   |  24  |  0.70    Ca    8.6      09 Apr 2019 05:57        MEDICATIONS  (STANDING):  heparin  Injectable 5000 Unit(s) SubCutaneous every 8 hours  HYDROmorphone PCA (1 mG/mL) 30 milliLiter(s) PCA Continuous PCA Continuous  lactated ringers. 1000 milliLiter(s) (100 mL/Hr) IV Continuous <Continuous>  sodium chloride 0.9% lock flush 3 milliLiter(s) IV Push every 8 hours    MEDICATIONS  (PRN):  HYDROmorphone PCA (1 mG/mL) Rescue Clinician Bolus 0.5 milliGRAM(s) IV Push every 15 minutes PRN for Pain Scale GREATER THAN 6  nalbuphine Injectable 2.5 milliGRAM(s) IV Push every 6 hours PRN Pruritus  naloxone Injectable 0.1 milliGRAM(s) IV Push every 3 minutes PRN For ANY of the following changes in patient status:  A. RR LESS THAN 10 breaths per minute, B. Oxygen saturation LESS THAN 90%, C. Sedation score of 6  ondansetron Injectable 4 milliGRAM(s) IV Push every 6 hours PRN Nausea      Assessment  59y Female  w/ PAST MEDICAL & SURGICAL HISTORY:  GERD (gastroesophageal reflux disease)  Dyslipidemia  Osteopenia  Obesity  Hypertension  Hypothyroidism  Hiatal hernia with GERD: &quot; Endoscopic lesion of stomach&quot;; 03/05/19  History of bunionectomy: right; 2006  H/O repair of right rotator cuff: 2002  Status post wrist surgery: right;  2003  History of appendectomy: 1991  admitted with complaints of Patient is a 59y old  Female who presents with a chief complaint of Hiatal hernia (09 Apr 2019 01:37)  .  On 4/8/19, patient underwent Robot-assisted repair of hiatal hernia with Nissen fundoplication using daVinci Xi. Patient will undergo a BS exam today.     PLAN  Neuro: Pain management  Pulm: Encourage coughing, deep breathing and use of incentive spirometry.  Daily CXR.   Cardio: Monitor telemetry/alarms  GI: NPO. Continue stool softeners.  Renal: monitor urine output, supplement electrolytes as needed  Vasc: Heparin SC/SCDs for DVT prophylaxis  Heme: Stable H/H.    ID: Off antibiotics. Stable.  Therapy: OOB/ambulate  Tubes: Monitor Chest tube output  Disposition: BS exam today and Aim to D/C to home once diet advance  Discussed with Cardiothoracic Team at AM rounds.

## 2019-04-09 NOTE — DISCHARGE NOTE NURSING/CASE MANAGEMENT/SOCIAL WORK - NSDCDPATPORTLINK_GEN_ALL_CORE
You can access the [facility], offered by [facility], by registering with the following website: [email or web address]

## 2019-04-09 NOTE — PROGRESS NOTE ADULT - SUBJECTIVE AND OBJECTIVE BOX
Anesthesia Pain Management Service    SUBJECTIVE: Pt now off IV PCA without problems reported.    Therapy:	  [ X] IV PCA	   [ ] Epidural           [ ] s/p Spinal Opoid              [ ] Postpartum infusion	  [ ] Patient controlled regional anesthesia (PCRA)    [ ] prn Analgesics    Allergies    No Known Allergies    Intolerances      MEDICATIONS  (STANDING):  docusate sodium 100 milliGRAM(s) Oral three times a day  heparin  Injectable 5000 Unit(s) SubCutaneous every 8 hours  simethicone 80 milliGRAM(s) Chew daily  sodium chloride 0.9% lock flush 3 milliLiter(s) IV Push every 8 hours    MEDICATIONS  (PRN):  acetaminophen   Tablet .. 650 milliGRAM(s) Oral every 6 hours PRN Mild Pain (1 - 3)  oxyCODONE    IR 5 milliGRAM(s) Oral every 4 hours PRN Moderate Pain (4 - 6)      OBJECTIVE:   [X] No new signs     [ ] Other:    Side Effects:  [X ] None			[ ] Other:    Assessment of Catheter Site:		[ ] Intact		[ ] Other:    ASSESSMENT/PLAN  [ ] Continue current therapy    [X ] Therapy changed to:    [ ] IV PCA       [ ] Epidural     [ X] prn Analgesics     Comments: PRN Oral/IV opioids and/or non-opioid adjuvant analgesics to be used at this point.    Progress Note written now but Patient was seen earlier.

## 2019-04-09 NOTE — PROGRESS NOTE ADULT - SUBJECTIVE AND OBJECTIVE BOX
Subjective: Pt is without complaints.  She voided and is using her PCA    Vital Signs:  Vital Signs Last 24 Hrs  T(C): 36.9 (04-09-19 @ 00:11), Max: 37.2 (04-08-19 @ 15:30)  T(F): 98.4 (04-09-19 @ 00:11), Max: 99 (04-08-19 @ 15:30)  HR: 63 (04-09-19 @ 00:11) (63 - 98)  BP: 128/55 (04-09-19 @ 00:11) (99/65 - 133/64)  RR: 18 (04-09-19 @ 00:11) (11 - 18)  SpO2: 98% (04-09-19 @ 00:11) (94% - 100%) on (O2)    Telemetry/Alarms:  General: WN/WD NAD  Neurology: Awake, nonfocal, LOZANO x 4  Eyes: Scleras clear, PERRLA/ EOMI, Gross vision intact  ENT: Gross hearing intact, grossly patent pharynx, no stridor  Neck: Neck supple, trachea midline, No JVD,   Respiratory: CTA B/L, No wheezing, rales, rhonchi  CV: RRR, S1S2, no murmurs  Abdominal: Soft, NT, ND +BS, multiple laparoscopic wound dressings are clean and dry  Extremities: No edema, + peripheral pulses  Skin: No Rashes, Hematoma, Ecchymosis  Lymphatic: No Neck, axilla, groin LAD  Psych: Oriented x 3, normal affect    Relevant labs, radiology and Medications reviewed    MEDICATIONS  (STANDING):  heparin  Injectable 5000 Unit(s) SubCutaneous every 8 hours  HYDROmorphone PCA (1 mG/mL) 30 milliLiter(s) PCA Continuous PCA Continuous  lactated ringers. 1000 milliLiter(s) (100 mL/Hr) IV Continuous <Continuous>  sodium chloride 0.9% lock flush 3 milliLiter(s) IV Push every 8 hours    MEDICATIONS  (PRN):  HYDROmorphone PCA (1 mG/mL) Rescue Clinician Bolus 0.5 milliGRAM(s) IV Push every 15 minutes PRN for Pain Scale GREATER THAN 6  nalbuphine Injectable 2.5 milliGRAM(s) IV Push every 6 hours PRN Pruritus  naloxone Injectable 0.1 milliGRAM(s) IV Push every 3 minutes PRN For ANY of the following changes in patient status:  A. RR LESS THAN 10 breaths per minute, B. Oxygen saturation LESS THAN 90%, C. Sedation score of 6  ondansetron Injectable 4 milliGRAM(s) IV Push every 6 hours PRN Nausea    Pertinent Physical Exam  I&O's Summary    08 Apr 2019 07:01  -  09 Apr 2019 01:31  --------------------------------------------------------  IN: 300 mL / OUT: 900 mL / NET: -600 mL    CXR: No PTX    Assessment  Stable s/p Robo-assisted hiatal hernia reapair with Nissen fundoplication    PLAN  Neuro: Pain management with IV PCA  Pulm: Encourage coughing, deep breathing and use of incentive spirometry. Daily CXR.   Cardio: Monitor telemetry/alarms  GI: NPO with IVF until after passing a Barium swallow  Renal: monitor urine output, supplement electrolytes as needed  Vasc: Heparin SC/SCDs for DVT prophylaxis  Heme: Monitor H/H. .   ID: Off antibiotics. Stable.  Physical Therapy: OOB/ambulate  Disposition: Aim to D/C to home after passing Barium swallow and tolerating a diet    Discussed with Cardiothoracic Team at AM rounds.

## 2019-04-09 NOTE — DISCHARGE NOTE PROVIDER - INSTRUCTIONS
Clear diet, advance as instructed Please follow a clear liquid diet for two days following discharge.   After two days you may have a full liquid diet for another two days.   Following two days of full liquids you may begin a soft food diet.   Please advance your diet only as tolerated and if you cannot tolerate advancing your diet, go back to the previous diet.   Eat 3-4 small meals a day and avoid carbonated beverages and extreme temperature drinks.   Also, remember to stay up right following each meal.

## 2019-04-09 NOTE — PROGRESS NOTE ADULT - SUBJECTIVE AND OBJECTIVE BOX
Anesthesia Pain Management Service    SUBJECTIVE: Patient is doing well with IV PCA and no significant problems reported.  Patient is NPO.  Patient admits to feel dizzy and nauseous when she stood up earlier, but does not feel dizziness or nausea after pressing the IV PCA.    Pain Scale Score	At rest: 6/10 ___ 	With Activity: ___ 	[X ] Refer to charted pain scores    THERAPY:    [ ] IV PCA Morphine		[ ] 5 mg/mL	[ ] 1 mg/mL  [X ] IV PCA Hydromorphone	[ ] 5 mg/mL	[X ] 1 mg/mL  [ ] IV PCA Fentanyl		[ ] 50 micrograms/mL    Demand dose __0.2_ lockout __6_ (minutes) Continuous Rate _0__ Total: _4.2__  mg used (in past 24 hours)      MEDICATIONS  (STANDING):  acetaminophen  IVPB .. 1000 milliGRAM(s) IV Intermittent once  heparin  Injectable 5000 Unit(s) SubCutaneous every 8 hours  HYDROmorphone PCA (1 mG/mL) 30 milliLiter(s) PCA Continuous PCA Continuous  lactated ringers. 1000 milliLiter(s) (100 mL/Hr) IV Continuous <Continuous>  sodium chloride 0.9% lock flush 3 milliLiter(s) IV Push every 8 hours    MEDICATIONS  (PRN):  HYDROmorphone PCA (1 mG/mL) Rescue Clinician Bolus 0.5 milliGRAM(s) IV Push every 15 minutes PRN for Pain Scale GREATER THAN 6  nalbuphine Injectable 2.5 milliGRAM(s) IV Push every 6 hours PRN Pruritus  naloxone Injectable 0.1 milliGRAM(s) IV Push every 3 minutes PRN For ANY of the following changes in patient status:  A. RR LESS THAN 10 breaths per minute, B. Oxygen saturation LESS THAN 90%, C. Sedation score of 6  ondansetron Injectable 4 milliGRAM(s) IV Push every 6 hours PRN Nausea      OBJECTIVE:  Patient is NPO, sitting up in bed.    Sedation Score:	[ X] Alert	[ ] Drowsy 	[ ] Arousable	[ ] Asleep	[ ] Unresponsive    Side Effects:	[X ] None	[ ] Nausea	[ ] Vomiting	[ ] Pruritus  		[ ] Other:    Vital Signs Last 24 Hrs  T(C): 36.9 (09 Apr 2019 08:40), Max: 37.2 (08 Apr 2019 15:30)  T(F): 98.4 (09 Apr 2019 08:40), Max: 99 (08 Apr 2019 15:30)  HR: 51 (09 Apr 2019 08:40) (51 - 80)  BP: 126/64 (09 Apr 2019 08:40) (114/66 - 133/64)  BP(mean): 83 (08 Apr 2019 17:00) (77 - 83)  RR: 18 (09 Apr 2019 04:19) (11 - 18)  SpO2: 95% (09 Apr 2019 08:40) (94% - 100%)    ASSESSMENT/ PLAN    Therapy to  be:	[ X] Continue   [ ] Discontinued   [ ] Change to prn Analgesics    Documentation and Verification of current medications:   [X] Done	[ ] Not done, not elligible    Comments:  Continue current pain regimen.  IV Tylenol added.  Advised patient to sit at edge of bed and dangle legs x few minutes prior to standing.

## 2019-04-09 NOTE — PROGRESS NOTE ADULT - SUBJECTIVE AND OBJECTIVE BOX
ANESTHESIA POSTOP CHECK    59y Female POSTOP DAY 1    Vital Signs Last 24 Hrs  T(C): 36.9 (09 Apr 2019 11:33), Max: 37.2 (08 Apr 2019 15:30)  T(F): 98.4 (09 Apr 2019 11:33), Max: 99 (08 Apr 2019 15:30)  HR: 50 (09 Apr 2019 11:33) (50 - 80)  BP: 122/60 (09 Apr 2019 11:33) (114/66 - 133/64)  BP(mean): 83 (08 Apr 2019 17:00) (77 - 83)  RR: 18 (09 Apr 2019 11:33) (11 - 18)  SpO2: 98% (09 Apr 2019 11:33) (94% - 100%)  I&O's Summary    08 Apr 2019 07:01  -  09 Apr 2019 07:00  --------------------------------------------------------  IN: 1500 mL / OUT: 1500 mL / NET: 0 mL    09 Apr 2019 07:01  -  09 Apr 2019 12:57  --------------------------------------------------------  IN: 500 mL / OUT: 0 mL / NET: 500 mL        [X ] NO APPARENT ANESTHESIA COMPLICATIONS      Comments: complains of nausea resolving on its own

## 2019-04-09 NOTE — DISCHARGE NOTE PROVIDER - NSDCFUADDINST_GEN_ALL_CORE_FT
- Call the office if you experience any fevers, shortness of breath, chest pain or excessive or purulent drainage from the incision site, leg swelling day or night. Go to the emergency room if any of these symptoms are severe.   - Take your medications as ordered and take a stool softener if needed with the narcotic medications.  - Call Dr. Simon's office at [phone] tomorrow or the next business day to make a followup appointment.  - Please get an CXR the day before your appointment and bring it with you to your follow up appointment.

## 2019-04-09 NOTE — DISCHARGE NOTE PROVIDER - NSDCACTIVITY_GEN_ALL_CORE
Walking - Indoors allowed/Walking - Outdoors allowed/Do not drive or operate machinery/No heavy lifting/straining/Showering allowed/Stairs allowed/Sex allowed No heavy lifting/straining/Walking - Outdoors allowed/Stairs allowed/Walking - Indoors allowed/Do not make important decisions/Showering allowed/Do not drive or operate machinery

## 2019-04-09 NOTE — DISCHARGE NOTE PROVIDER - PROVIDER TOKENS
PROVIDER:[TOKEN:[[id number]]],FREE:[LAST:[Igor],FIRST:[Garth],PHONE:[phone]],FAX:[(   )    -]]

## 2019-04-11 PROBLEM — I10 ESSENTIAL (PRIMARY) HYPERTENSION: Chronic | Status: ACTIVE | Noted: 2019-03-25

## 2019-04-11 PROBLEM — E03.9 HYPOTHYROIDISM, UNSPECIFIED: Chronic | Status: ACTIVE | Noted: 2019-03-25

## 2019-04-11 PROBLEM — E66.9 OBESITY, UNSPECIFIED: Chronic | Status: ACTIVE | Noted: 2019-03-25

## 2019-04-11 PROBLEM — M85.80 OTHER SPECIFIED DISORDERS OF BONE DENSITY AND STRUCTURE, UNSPECIFIED SITE: Chronic | Status: ACTIVE | Noted: 2019-03-25

## 2019-04-11 PROBLEM — E78.5 HYPERLIPIDEMIA, UNSPECIFIED: Chronic | Status: ACTIVE | Noted: 2019-03-25

## 2019-04-22 ENCOUNTER — FORM ENCOUNTER (OUTPATIENT)
Age: 59
End: 2019-04-22

## 2019-04-23 ENCOUNTER — APPOINTMENT (OUTPATIENT)
Dept: THORACIC SURGERY | Facility: CLINIC | Age: 59
End: 2019-04-23
Payer: COMMERCIAL

## 2019-04-23 ENCOUNTER — OUTPATIENT (OUTPATIENT)
Dept: OUTPATIENT SERVICES | Facility: HOSPITAL | Age: 59
LOS: 1 days | End: 2019-04-23
Payer: COMMERCIAL

## 2019-04-23 ENCOUNTER — APPOINTMENT (OUTPATIENT)
Dept: RADIOLOGY | Facility: HOSPITAL | Age: 59
End: 2019-04-23

## 2019-04-23 VITALS
WEIGHT: 186 LBS | TEMPERATURE: 98.3 F | RESPIRATION RATE: 16 BRPM | HEART RATE: 52 BPM | DIASTOLIC BLOOD PRESSURE: 66 MMHG | BODY MASS INDEX: 27.55 KG/M2 | OXYGEN SATURATION: 97 % | SYSTOLIC BLOOD PRESSURE: 99 MMHG | HEIGHT: 69 IN

## 2019-04-23 DIAGNOSIS — Z90.49 ACQUIRED ABSENCE OF OTHER SPECIFIED PARTS OF DIGESTIVE TRACT: Chronic | ICD-10-CM

## 2019-04-23 DIAGNOSIS — Z98.890 OTHER SPECIFIED POSTPROCEDURAL STATES: Chronic | ICD-10-CM

## 2019-04-23 DIAGNOSIS — K21.9 GASTRO-ESOPHAGEAL REFLUX DISEASE WITHOUT ESOPHAGITIS: Chronic | ICD-10-CM

## 2019-04-23 DIAGNOSIS — K44.9 DIAPHRAGMATIC HERNIA WITHOUT OBSTRUCTION OR GANGRENE: ICD-10-CM

## 2019-04-23 PROCEDURE — 99024 POSTOP FOLLOW-UP VISIT: CPT

## 2019-04-23 PROCEDURE — 71046 X-RAY EXAM CHEST 2 VIEWS: CPT | Mod: 26

## 2019-06-03 ENCOUNTER — FORM ENCOUNTER (OUTPATIENT)
Age: 59
End: 2019-06-03

## 2019-06-04 ENCOUNTER — OUTPATIENT (OUTPATIENT)
Dept: OUTPATIENT SERVICES | Facility: HOSPITAL | Age: 59
LOS: 1 days | End: 2019-06-04
Payer: COMMERCIAL

## 2019-06-04 ENCOUNTER — APPOINTMENT (OUTPATIENT)
Dept: RADIOLOGY | Facility: HOSPITAL | Age: 59
End: 2019-06-04

## 2019-06-04 ENCOUNTER — APPOINTMENT (OUTPATIENT)
Dept: THORACIC SURGERY | Facility: CLINIC | Age: 59
End: 2019-06-04
Payer: COMMERCIAL

## 2019-06-04 DIAGNOSIS — Z98.890 OTHER SPECIFIED POSTPROCEDURAL STATES: Chronic | ICD-10-CM

## 2019-06-04 DIAGNOSIS — Z90.49 ACQUIRED ABSENCE OF OTHER SPECIFIED PARTS OF DIGESTIVE TRACT: Chronic | ICD-10-CM

## 2019-06-04 DIAGNOSIS — K44.9 DIAPHRAGMATIC HERNIA WITHOUT OBSTRUCTION OR GANGRENE: ICD-10-CM

## 2019-06-04 DIAGNOSIS — K21.9 GASTRO-ESOPHAGEAL REFLUX DISEASE WITHOUT ESOPHAGITIS: Chronic | ICD-10-CM

## 2019-06-04 PROCEDURE — 99024 POSTOP FOLLOW-UP VISIT: CPT

## 2019-06-04 PROCEDURE — 71046 X-RAY EXAM CHEST 2 VIEWS: CPT | Mod: 26

## 2019-06-06 VITALS
DIASTOLIC BLOOD PRESSURE: 72 MMHG | BODY MASS INDEX: 27.32 KG/M2 | OXYGEN SATURATION: 97 % | SYSTOLIC BLOOD PRESSURE: 115 MMHG | WEIGHT: 185 LBS | HEART RATE: 54 BPM | RESPIRATION RATE: 16 BRPM | TEMPERATURE: 98.2 F

## 2019-06-06 RX ORDER — OMEPRAZOLE 40 MG/1
40 CAPSULE, DELAYED RELEASE ORAL
Refills: 0 | Status: COMPLETED | COMMUNITY
End: 2019-06-06

## 2019-10-08 NOTE — ASU PATIENT PROFILE, ADULT - PT NEEDS ASSIST
Pt. brought to ED via EMS from assisted living for unwitnessed fall. Per EMS pt. found on floor in room, epistaxis at time patient was found. Pt. on anticoagulation. no

## 2019-12-17 PROBLEM — K44.9 HERNIA, HIATAL: Status: ACTIVE | Noted: 2019-02-07

## 2019-12-18 ENCOUNTER — FORM ENCOUNTER (OUTPATIENT)
Age: 59
End: 2019-12-18

## 2019-12-19 ENCOUNTER — APPOINTMENT (OUTPATIENT)
Dept: RADIOLOGY | Facility: HOSPITAL | Age: 59
End: 2019-12-19

## 2019-12-19 ENCOUNTER — OUTPATIENT (OUTPATIENT)
Dept: OUTPATIENT SERVICES | Facility: HOSPITAL | Age: 59
LOS: 1 days | End: 2019-12-19
Payer: COMMERCIAL

## 2019-12-19 ENCOUNTER — APPOINTMENT (OUTPATIENT)
Dept: THORACIC SURGERY | Facility: CLINIC | Age: 59
End: 2019-12-19
Payer: COMMERCIAL

## 2019-12-19 VITALS
TEMPERATURE: 98.6 F | DIASTOLIC BLOOD PRESSURE: 82 MMHG | HEIGHT: 68.5 IN | WEIGHT: 185 LBS | OXYGEN SATURATION: 97 % | RESPIRATION RATE: 17 BRPM | SYSTOLIC BLOOD PRESSURE: 129 MMHG | HEART RATE: 55 BPM | BODY MASS INDEX: 27.72 KG/M2

## 2019-12-19 DIAGNOSIS — Z98.890 OTHER SPECIFIED POSTPROCEDURAL STATES: Chronic | ICD-10-CM

## 2019-12-19 DIAGNOSIS — Z90.49 ACQUIRED ABSENCE OF OTHER SPECIFIED PARTS OF DIGESTIVE TRACT: Chronic | ICD-10-CM

## 2019-12-19 DIAGNOSIS — K44.9 DIAPHRAGMATIC HERNIA W/OUT OBSTRUCTION OR GANGRENE: ICD-10-CM

## 2019-12-19 DIAGNOSIS — K44.9 DIAPHRAGMATIC HERNIA WITHOUT OBSTRUCTION OR GANGRENE: ICD-10-CM

## 2019-12-19 DIAGNOSIS — K21.9 GASTRO-ESOPHAGEAL REFLUX DISEASE WITHOUT ESOPHAGITIS: Chronic | ICD-10-CM

## 2019-12-19 PROCEDURE — 71046 X-RAY EXAM CHEST 2 VIEWS: CPT | Mod: 26

## 2019-12-19 PROCEDURE — 99213 OFFICE O/P EST LOW 20 MIN: CPT

## 2019-12-20 NOTE — PHYSICAL EXAM
[Auscultation Breath Sounds / Voice Sounds] : lungs were clear to auscultation bilaterally [Heart Rate And Rhythm] : heart rate was normal and rhythm regular [Heart Sounds] : normal S1 and S2 [Heart Sounds Gallop] : no gallops [Murmurs] : no murmurs [Diminished Respiratory Excursion] : normal chest expansion [Heart Sounds Pericardial Friction Rub] : no pericardial rub [Examination Of The Chest] : the chest was normal in appearance [Chest Visual Inspection Thoracic Asymmetry] : no chest asymmetry [Abdomen Tenderness] : non-tender [Abdomen Soft] : soft [Bowel Sounds] : normal bowel sounds [Cervical Lymph Nodes Enlarged Posterior Bilaterally] : posterior cervical [Abdomen Mass (___ Cm)] : no abdominal mass palpated [Cervical Lymph Nodes Enlarged Anterior Bilaterally] : anterior cervical [Supraclavicular Lymph Nodes Enlarged Bilaterally] : supraclavicular [No CVA Tenderness] : no ~M costovertebral angle tenderness [No Spinal Tenderness] : no spinal tenderness [Nail Clubbing] : no clubbing  or cyanosis of the fingernails [Abnormal Walk] : normal gait [Musculoskeletal - Swelling] : no joint swelling seen [] : no rash [Motor Tone] : muscle strength and tone were normal [Skin Color & Pigmentation] : normal skin color and pigmentation [Skin Turgor] : normal skin turgor [Deep Tendon Reflexes (DTR)] : deep tendon reflexes were 2+ and symmetric [No Focal Deficits] : no focal deficits [Sensation] : the sensory exam was normal to light touch and pinprick [Affect] : the affect was normal [Impaired Insight] : insight and judgment were intact [Oriented To Time, Place, And Person] : oriented to person, place, and time

## 2019-12-27 NOTE — HISTORY OF PRESENT ILLNESS
[FreeTextEntry1] : 60 y/o female, former smoker, w/ hx of reflux, HTN, who presented to Dr. Luis Carlos Castle for severe acid reflux. \par \par EGD on 1/14/19 showed a 3 cm hiatal hernia and a small-medium sized submucosal lesion in the gastric body along the greater curvature, on EUS, it was a 12.2 x 7.6mm hypoechoic and homogenous layer IV lesion, suggestive of a GIST (no bx done). \par \par Manometry on 3/13/19 revealed a hiatal hernia of 2.2 cm.\par \par CT Chest on 3/25/19 revealed a small hiatal hernia and a 3.8cm Lt renal cyst.\par \par Barium Swallow on 3/25/19 revealed a small to moderate hiatal hernia and no evidence of gastroesophageal reflux\par \par Now 8 mo s/p EGD, laparoscopy, robotic assisted, repair of hiatal hernia, Nissen fundoplication on 4/8/19. Path showed fibromembranous tissue c/w hernia sac and a benign lymph node w/ germinal centers. \par \par CXR on 12/19/2019: clear, no PTX. \par \par Patient is here today for a follow up. Patient is dong well. patient denied reflux, CP, SOB, and cough. \par

## 2019-12-27 NOTE — CONSULT LETTER
[Dear  ___] : Dear  [unfilled], [Consult Letter:] : I had the pleasure of evaluating your patient, [unfilled]. [( Thank you for referring [unfilled] for consultation for _____ )] : Thank you for referring [unfilled] for consultation for [unfilled] [Please see my note below.] : Please see my note below. [Consult Closing:] : Thank you very much for allowing me to participate in the care of this patient.  If you have any questions, please do not hesitate to contact me. [Sincerely,] : Sincerely, [FreeTextEntry2] : Dr. Luis Carlos Castle (GI/Ref) \par \par  [FreeTextEntry3] : Rainer Simon MD, MPH \par System Director of Thoracic Surgery \par Director of Comprehensive Lung and Foregut [place] \par Professor Cardiovascular & Thoracic Surgery  \par [facility] [employer] [facility]\par

## 2019-12-27 NOTE — ASSESSMENT
[FreeTextEntry1] : 58 y/o female, former smoker, w/ hx of reflux, HTN, who presented to Dr. Luis Carlos Castle for severe acid reflux. \par \par Now 8 mo s/p EGD, laparoscopy, robotic assisted, repair of hiatal hernia, Nissen fundoplication on 4/8/19. Path showed fibromembranous tissue c/w hernia sac and a benign lymph node w/ germinal centers. \par \par CXR on 12/19/2019: clear, no PTX. \par \par I have reviewed the patient's medical records and diagnostic images at time of this office consultation and have made the following recommendation:\par 1. CXR reviewed. Patient is doing well. I recommended patient to RTC in 1 yr with Barium Esophagram\par \par \par I personally performed the services described in the documentation, reviewed the documentation recorded by the scribe in my presence and it accurately and completely records my words and actions.\par \par I, ZAIN GOMEZ NP and Radha Espinosa NP, am scribing for and the presence of COLIN Hammer, the following sections HISTORY OF PRESENT ILLNESS, PAST MEDICAL/FAMILY/SOCIAL HISTORY; REVIEW OF SYSTEMS; VITAL SIGNS; PHYSICAL EXAM; DISPOSITION.

## 2020-02-03 ENCOUNTER — EMERGENCY (EMERGENCY)
Facility: HOSPITAL | Age: 60
LOS: 1 days | Discharge: HOME | End: 2020-02-03
Attending: EMERGENCY MEDICINE
Payer: COMMERCIAL

## 2020-02-03 VITALS
HEIGHT: 68 IN | HEART RATE: 54 BPM | RESPIRATION RATE: 18 BRPM | OXYGEN SATURATION: 100 % | DIASTOLIC BLOOD PRESSURE: 82 MMHG | SYSTOLIC BLOOD PRESSURE: 148 MMHG | TEMPERATURE: 98 F | WEIGHT: 184.97 LBS

## 2020-02-03 DIAGNOSIS — Z98.890 OTHER SPECIFIED POSTPROCEDURAL STATES: Chronic | ICD-10-CM

## 2020-02-03 DIAGNOSIS — Z90.49 ACQUIRED ABSENCE OF OTHER SPECIFIED PARTS OF DIGESTIVE TRACT: Chronic | ICD-10-CM

## 2020-02-03 DIAGNOSIS — K21.9 GASTRO-ESOPHAGEAL REFLUX DISEASE WITHOUT ESOPHAGITIS: Chronic | ICD-10-CM

## 2020-02-03 LAB
ALBUMIN SERPL ELPH-MCNC: 4.5 G/DL — SIGNIFICANT CHANGE UP (ref 3.3–5.2)
ALP SERPL-CCNC: 68 U/L — SIGNIFICANT CHANGE UP (ref 40–120)
ALT FLD-CCNC: 15 U/L — SIGNIFICANT CHANGE UP
ANION GAP SERPL CALC-SCNC: 14 MMOL/L — SIGNIFICANT CHANGE UP (ref 5–17)
APPEARANCE UR: CLEAR — SIGNIFICANT CHANGE UP
AST SERPL-CCNC: 19 U/L — SIGNIFICANT CHANGE UP
BASOPHILS # BLD AUTO: 0.06 K/UL — SIGNIFICANT CHANGE UP (ref 0–0.2)
BASOPHILS NFR BLD AUTO: 0.6 % — SIGNIFICANT CHANGE UP (ref 0–2)
BILIRUB SERPL-MCNC: 0.7 MG/DL — SIGNIFICANT CHANGE UP (ref 0.4–2)
BILIRUB UR-MCNC: NEGATIVE — SIGNIFICANT CHANGE UP
BUN SERPL-MCNC: 19 MG/DL — SIGNIFICANT CHANGE UP (ref 8–20)
CALCIUM SERPL-MCNC: 9.3 MG/DL — SIGNIFICANT CHANGE UP (ref 8.6–10.2)
CHLORIDE SERPL-SCNC: 103 MMOL/L — SIGNIFICANT CHANGE UP (ref 98–107)
CO2 SERPL-SCNC: 25 MMOL/L — SIGNIFICANT CHANGE UP (ref 22–29)
COLOR SPEC: YELLOW — SIGNIFICANT CHANGE UP
CREAT SERPL-MCNC: 0.75 MG/DL — SIGNIFICANT CHANGE UP (ref 0.5–1.3)
DIFF PNL FLD: ABNORMAL
EOSINOPHIL # BLD AUTO: 0.09 K/UL — SIGNIFICANT CHANGE UP (ref 0–0.5)
EOSINOPHIL NFR BLD AUTO: 1 % — SIGNIFICANT CHANGE UP (ref 0–6)
EPI CELLS # UR: SIGNIFICANT CHANGE UP
GLUCOSE SERPL-MCNC: 88 MG/DL — SIGNIFICANT CHANGE UP (ref 70–99)
GLUCOSE UR QL: NEGATIVE MG/DL — SIGNIFICANT CHANGE UP
HCT VFR BLD CALC: 42.2 % — SIGNIFICANT CHANGE UP (ref 34.5–45)
HGB BLD-MCNC: 13.9 G/DL — SIGNIFICANT CHANGE UP (ref 11.5–15.5)
IMM GRANULOCYTES NFR BLD AUTO: 0.2 % — SIGNIFICANT CHANGE UP (ref 0–1.5)
KETONES UR-MCNC: NEGATIVE — SIGNIFICANT CHANGE UP
LEUKOCYTE ESTERASE UR-ACNC: NEGATIVE — SIGNIFICANT CHANGE UP
LIDOCAIN IGE QN: 32 U/L — SIGNIFICANT CHANGE UP (ref 22–51)
LYMPHOCYTES # BLD AUTO: 4.01 K/UL — HIGH (ref 1–3.3)
LYMPHOCYTES # BLD AUTO: 42.8 % — SIGNIFICANT CHANGE UP (ref 13–44)
MCHC RBC-ENTMCNC: 31 PG — SIGNIFICANT CHANGE UP (ref 27–34)
MCHC RBC-ENTMCNC: 32.9 GM/DL — SIGNIFICANT CHANGE UP (ref 32–36)
MCV RBC AUTO: 94.2 FL — SIGNIFICANT CHANGE UP (ref 80–100)
MONOCYTES # BLD AUTO: 0.68 K/UL — SIGNIFICANT CHANGE UP (ref 0–0.9)
MONOCYTES NFR BLD AUTO: 7.3 % — SIGNIFICANT CHANGE UP (ref 2–14)
NEUTROPHILS # BLD AUTO: 4.5 K/UL — SIGNIFICANT CHANGE UP (ref 1.8–7.4)
NEUTROPHILS NFR BLD AUTO: 48.1 % — SIGNIFICANT CHANGE UP (ref 43–77)
NITRITE UR-MCNC: NEGATIVE — SIGNIFICANT CHANGE UP
PH UR: 6.5 — SIGNIFICANT CHANGE UP (ref 5–8)
PLATELET # BLD AUTO: 236 K/UL — SIGNIFICANT CHANGE UP (ref 150–400)
POTASSIUM SERPL-MCNC: 4.2 MMOL/L — SIGNIFICANT CHANGE UP (ref 3.5–5.3)
POTASSIUM SERPL-SCNC: 4.2 MMOL/L — SIGNIFICANT CHANGE UP (ref 3.5–5.3)
PROT SERPL-MCNC: 7.1 G/DL — SIGNIFICANT CHANGE UP (ref 6.6–8.7)
PROT UR-MCNC: NEGATIVE MG/DL — SIGNIFICANT CHANGE UP
RBC # BLD: 4.48 M/UL — SIGNIFICANT CHANGE UP (ref 3.8–5.2)
RBC # FLD: 12.5 % — SIGNIFICANT CHANGE UP (ref 10.3–14.5)
RBC CASTS # UR COMP ASSIST: SIGNIFICANT CHANGE UP /HPF (ref 0–4)
SODIUM SERPL-SCNC: 142 MMOL/L — SIGNIFICANT CHANGE UP (ref 135–145)
SP GR SPEC: 1.01 — SIGNIFICANT CHANGE UP (ref 1.01–1.02)
TROPONIN T SERPL-MCNC: 0.02 NG/ML — SIGNIFICANT CHANGE UP (ref 0–0.06)
UROBILINOGEN FLD QL: NEGATIVE MG/DL — SIGNIFICANT CHANGE UP
WBC # BLD: 9.36 K/UL — SIGNIFICANT CHANGE UP (ref 3.8–10.5)
WBC # FLD AUTO: 9.36 K/UL — SIGNIFICANT CHANGE UP (ref 3.8–10.5)
WBC UR QL: NEGATIVE — SIGNIFICANT CHANGE UP

## 2020-02-03 PROCEDURE — 85027 COMPLETE CBC AUTOMATED: CPT

## 2020-02-03 PROCEDURE — 99284 EMERGENCY DEPT VISIT MOD MDM: CPT

## 2020-02-03 PROCEDURE — 83690 ASSAY OF LIPASE: CPT

## 2020-02-03 PROCEDURE — 71046 X-RAY EXAM CHEST 2 VIEWS: CPT | Mod: 26

## 2020-02-03 PROCEDURE — 80053 COMPREHEN METABOLIC PANEL: CPT

## 2020-02-03 PROCEDURE — 93010 ELECTROCARDIOGRAM REPORT: CPT

## 2020-02-03 PROCEDURE — 36415 COLL VENOUS BLD VENIPUNCTURE: CPT

## 2020-02-03 PROCEDURE — 71275 CT ANGIOGRAPHY CHEST: CPT

## 2020-02-03 PROCEDURE — 71275 CT ANGIOGRAPHY CHEST: CPT | Mod: 26

## 2020-02-03 PROCEDURE — 93005 ELECTROCARDIOGRAM TRACING: CPT

## 2020-02-03 PROCEDURE — 99285 EMERGENCY DEPT VISIT HI MDM: CPT

## 2020-02-03 PROCEDURE — 81001 URINALYSIS AUTO W/SCOPE: CPT

## 2020-02-03 PROCEDURE — 84484 ASSAY OF TROPONIN QUANT: CPT

## 2020-02-03 PROCEDURE — 81025 URINE PREGNANCY TEST: CPT

## 2020-02-03 PROCEDURE — 71046 X-RAY EXAM CHEST 2 VIEWS: CPT

## 2020-02-03 NOTE — ED PROVIDER NOTE - OBJECTIVE STATEMENT
Pt is a 59 y.o. F hx of HTN, GERD, hypothyroidism presenting with chest pain for one day. The pt states yesterday she had chest pain, gino Pt is a 59 y.o. F hx of HTN, GERD, hypothyroidism presenting with chest pain for one day. The pt states yesterday she had chest pain, chest pressure, shortness of breath, numbness from her shoulders to her fingers in both extremities and dysuria yesterday night. Today she has only had residual chest pressure substernally and numbness of the shoulders. Pt came to ED because urged by PMD and concerned about lasting chest pressure. Has also been having calf pain bilaterally for the last six weeks with no obvious inciting events, has a sedentary desk job, no recent travel, no recent surgery, no recent immobilization/hospitalization. Pt states at baseline she is bradycardic

## 2020-02-03 NOTE — ED PROVIDER NOTE - PMH
Dyslipidemia    GERD (gastroesophageal reflux disease)    Hypertension    Hypothyroidism    Obesity    Osteopenia

## 2020-02-03 NOTE — ED ADULT TRIAGE NOTE - CHIEF COMPLAINT QUOTE
"I went to see my doctor today b/c I had chest pain that started late last night and I had chest pressure and nausea and numbness in my hands and nausea and pain in my mid back. I thought it was indigestion but I did not feel better and I feel weak"  Pt A & Ox.4

## 2020-02-03 NOTE — ED PROVIDER NOTE - NSFOLLOWUPINSTRUCTIONS_ED_ALL_ED_FT
Follow up with your medical doctor and Cardiologist this week-call to schedule appt  Continue your medications as instructed  Return sooner for any problems    Chest Pain    Chest pain can be caused by many different conditions which may or may not be dangerous. Causes include heartburn, lung infections, heart attack, blood clot in lungs, skin infections, strain or damage to muscle, cartilage, or bones, etc. In addition to a history and physical examination, an electrocardiogram (ECG) or other lab tests may have been performed to determine the cause of your chest pain. Follow up with your primary care provider or with a cardiologist as instructed.     SEEK IMMEDIATE MEDICAL CARE IF YOU HAVE ANY OF THE FOLLOWING SYMPTOMS: worsening chest pain, coughing up blood, unexplained back/neck/jaw pain, severe abdominal pain, dizziness or lightheadedness, fainting, shortness of breath, sweaty or clammy skin, vomiting, or racing heart beat. These symptoms may represent a serious problem that is an emergency. Do not wait to see if the symptoms will go away. Get medical help right away. Call 911 and do not drive yourself to the hospital.

## 2020-02-03 NOTE — ED PROVIDER NOTE - NS ED ROS FT
Constitutional: no fever, and no chills.  Eyes: no pain, no redness, and no visual changes.  ENMT: no ear pain and no hearing problems, no nasal congestion/drainage, no dysphagia, and no throat pain, no neck pain, no stiffness  CV: no edema.  Resp: no cough  GI: no abdominal pain, no bloating, no constipation, no diarrhea, and no vomiting.  : no hematuria  MSK: no msk pain, no weakness  Skin: no jaundice, no lesions, and no rashes.  Neuro: no LOC, no headache, no sensory deficits, and no weakness.

## 2020-02-03 NOTE — ED PROVIDER NOTE - PSH
H/O repair of right rotator cuff  2002  Hiatal hernia with GERD  S/P HIATAL HERNIA REPAIR 2019  " Endoscopic lesion of stomach"; 03/05/19  History of appendectomy  1991  History of bunionectomy  right; 2006  Status post wrist surgery  right;  2003

## 2020-02-03 NOTE — ED PROVIDER NOTE - CLINICAL SUMMARY MEDICAL DECISION MAKING FREE TEXT BOX
Pt is a 59 y.o. F presenting with chest pressure today, sinus nelson, calf pain, more symptomatic yesterday. Labs, EKG, imaging, meds.

## 2020-02-03 NOTE — ED PROVIDER NOTE - PROGRESS NOTE DETAILS
Labs as noted.  CTA neg for PE.  Pt stable for d/c with f/u as outpt Labs as noted.  CTA neg for PE.  Pt stable for d/c with f/u as outpt.  Pt walked out prior to receiving discharge instructions

## 2020-02-03 NOTE — ED PROVIDER NOTE - ATTENDING CONTRIBUTION TO CARE
Allen: I performed a face to face evaluation of this patient and performed a full history and physical examination on the patient.  I agree with the resident's history, physical examination, and plan of the patient unless otherwise noted. My brief assessment is as follows: hx htn, hld, hypothyroid c/o some chest pressure/discomfort since last night with some mild sob. Staets had tingling b/l fingertips. Has had some calf pain, some back discomfort recently. No hx dvt/pe, ocps, swelling. ekg non ischemic. will do labs, cta chest, close f/u cardiology as needed.

## 2020-02-03 NOTE — ED PROVIDER NOTE - PHYSICAL EXAMINATION
General: well appearing, NAD  Head:  NC, AT  Eyes: EOMI, PERRLA, no scleral icterus  Ears: no external auditory erythema/drainage  Nose: midline, normal turbinates, no bleeding/drainage  Throat: uvula midline, no lesions, MMM  Cardiac: RRR, no m/r/g, no lower extremity edema  Respiratory: CTABL, no wheezes/rales, equal chest wall expansions  Abdomen: soft, ND, NT, no rebound tenderness, no guarding, nonperitonitic  MSK/Vascular: full ROM, distal pulses intact, soft compartments, warm extremities, no calf tenderness  Neuro: AAOx3, negative pronator drift, strength 5/5, sensation to light touch intact, cranial nerves 2-12 intact  Psych: calm, cooperative, normal affect

## 2020-02-03 NOTE — ED PROVIDER NOTE - PATIENT PORTAL LINK FT
You can access the FollowMyHealth Patient Portal offered by [facility] by registering at the following website: [email or web address]. By joining [employer]’s FollowMyHealth portal, you will also be able to view your health information using other applications (apps) compatible with our system.

## 2020-02-04 VITALS
HEART RATE: 54 BPM | TEMPERATURE: 98 F | RESPIRATION RATE: 18 BRPM | DIASTOLIC BLOOD PRESSURE: 66 MMHG | OXYGEN SATURATION: 99 % | SYSTOLIC BLOOD PRESSURE: 125 MMHG

## 2020-02-04 NOTE — ED ADULT NURSE NOTE - NS ED NURSE ELOPE COMMENTS
Patient yelling in profane language stating" I am so tierd of waiting this doctor made me wait so long I will make sure this doctor do not get paid." Refusing to wait for MD any more. Patient refused to see MD any more. insisting to leave iv removed.

## 2020-02-04 NOTE — ED ADULT NURSE NOTE - OBJECTIVE STATEMENT
Patient presented to ed secondary to chest pain radiating to neck and shoulder. denies nausea vomiting and dizziness.

## 2020-02-04 NOTE — ED ADULT NURSE NOTE - CHPI ED NUR SYMPTOMS NEG
no tingling/no vomiting/no weakness/no dizziness/no fever/no nausea/no pain/no chills/no decreased eating/drinking

## 2020-06-08 NOTE — ASU PATIENT PROFILE, ADULT - NSALCOHOLAMT_GEN_A_CORE_SD
1-2 drinks Cartilage Graft Text: The defect edges were debeveled with a #15 scalpel blade.  Given the location of the defect, shape of the defect, the fact the defect involved a full thickness cartilage defect a cartilage graft was deemed most appropriate.  An appropriate donor site was identified, cleansed, and anesthetized. The cartilage graft was then harvested and transferred to the recipient site, oriented appropriately and then sutured into place.  The secondary defect was then repaired using a primary closure.

## 2020-12-14 ENCOUNTER — APPOINTMENT (OUTPATIENT)
Dept: RADIOLOGY | Facility: HOSPITAL | Age: 60
End: 2020-12-14

## 2020-12-17 ENCOUNTER — APPOINTMENT (OUTPATIENT)
Dept: THORACIC SURGERY | Facility: CLINIC | Age: 60
End: 2020-12-17

## 2021-04-06 NOTE — PROVIDER CONTACT NOTE (OTHER) - NAME OF MD/NP/PA/DO NOTIFIED:
CANDICERANDELL  MRN-[id number]    Follow Up:  COVID, PTX    Interval History: breathing well on RA, eating, chest tube in place, not a lot of output, CXR stable, HIV negative   ROS:    [ ] Unobtainable because:  [X ] All other systems negative    Constitutional: no fever, no chills  Head: no trauma  Eyes: no vision changes, no eye pain  ENT:  no sore throat, no rhinorrhea  Cardiovascular:  no chest pain, no palpitation  Respiratory:  no SOB, no cough  GI:  no abd pain, no vomiting, no diarrhea  urinary: no dysuria, no hematuria, no flank pain  musculoskeletal:  no joint pain, no joint swelling  skin:  no rash  neurology:  no headache, no seizure, no change in mental status  psych: no anxiety, no depression         Allergies  No Known Allergies        ANTIMICROBIALS:      OTHER MEDS:  ALBUTerol    90 MICROgram(s) HFA Inhaler 2 Puff(s) Inhalation every 6 hours  budesonide 160 MICROgram(s)/formoterol 4.5 MICROgram(s) Inhaler 2 Puff(s) Inhalation two times a day  enoxaparin Injectable 40 milliGRAM(s) SubCutaneous every 12 hours  tiotropium 18 MICROgram(s) Capsule 1 Capsule(s) Inhalation daily      Physical Exam:  Vital Signs Last 24 Hrs  T(C): 37.4 (06 Apr 2021 15:08), Max: 37.4 (06 Apr 2021 15:08)  T(F): 99.3 (06 Apr 2021 15:08), Max: 99.3 (06 Apr 2021 15:08)  HR: 98 (06 Apr 2021 15:08) (74 - 98)  BP: 131/88 (06 Apr 2021 15:08) (117/78 - 131/88)  BP(mean): --  RR: 17 (06 Apr 2021 15:08) (17 - 17)  SpO2: 94% (06 Apr 2021 15:08) (94% - 99%)    General:    NAD,  non toxic, off NC and breathing comfortably on RA  Head: atraumatic, normocephalic  Eye: normal sclera and conjunctiva  ENT:    no oral lesions, neck supple  Cardio:     regular S1, S2,  no murmur, + right sided chest tube   Respiratory:    clear b/l,    no wheezing  abd:     soft,   BS +,   no tenderness  :   no CVAT,  no suprapubic tenderness,   no  harris  Musculoskeletal:   no joint swelling,   no edema  vascular: no central lines, +PIV   Skin:    no rash  Neurologic:     no focal deficit  psych: normal affect    WBC Count: 4.41 K/uL (04-05 @ 09:10)  WBC Count: 6.86 K/uL (04-03 @ 16:12)                            14.0   4.41  )-----------( 209      ( 05 Apr 2021 09:10 )             42.7       04-05    138  |  102  |  9   ----------------------------<  91  4.0   |  29  |  1.05    Ca    9.3      05 Apr 2021 09:10  Phos  3.0     04-05  Mg     2.1     04-05    TPro  7.3  /  Alb  2.8<L>  /  TBili  0.6  /  DBili  x   /  AST  21  /  ALT  34  /  AlkPhos  45  04-05          Creatinine Trend: 1.05<--, 1.12<--, 1.16<--, 1.09<--, 1.06<--, 0.98<--      MICROBIOLOGY:  HIV-1/2 Antigen/Antibody Screen by [facility] (04.06.21 @ 11:01)    HIV-1/2 Combo Result: Nonreact.    Ferritin, Serum: 462 (04-05)    D-Dimer Assay, Quantitative: 577 (04-05)  D-Dimer Assay, Quantitative: 464 (04-03)    Procalcitonin, Serum: 0.05 (04-05-21 @ 11:19)      RADIOLOGY:  < from: Xray Chest 1 View- PORTABLE-Urgent (Xray Chest 1 View- PORTABLE-Urgent .) (04.06.21 @ 11:45) >  IMPRESSION: Unchanged chest as above.         Radha Mabry, PA CANDICERANDELL  MRN-[id number]    Follow Up:  COVID, PTX    Interval History: breathing well on RA, eating, chest tube in place, not a lot of output, CXR stable, HIV negative, left foot very tender due to acute gout flare  ROS:    [ ] Unobtainable because:  [X ] All other systems negative    Constitutional: no fever, no chills  Head: no trauma  Eyes: no vision changes, no eye pain  ENT:  no sore throat, no rhinorrhea  Cardiovascular:  no chest pain, no palpitation  Respiratory:  no SOB, no cough  GI:  no abd pain, no vomiting, no diarrhea  urinary: no dysuria, no hematuria, no flank pain  musculoskeletal:  +joint pain, no joint swelling  skin:  no rash  neurology:  no headache, no seizure, no change in mental status  psych: no anxiety, no depression         Allergies  No Known Allergies        ANTIMICROBIALS:      OTHER MEDS:  ALBUTerol    90 MICROgram(s) HFA Inhaler 2 Puff(s) Inhalation every 6 hours  budesonide 160 MICROgram(s)/formoterol 4.5 MICROgram(s) Inhaler 2 Puff(s) Inhalation two times a day  enoxaparin Injectable 40 milliGRAM(s) SubCutaneous every 12 hours  tiotropium 18 MICROgram(s) Capsule 1 Capsule(s) Inhalation daily      Physical Exam:  Vital Signs Last 24 Hrs  T(C): 37.4 (06 Apr 2021 15:08), Max: 37.4 (06 Apr 2021 15:08)  T(F): 99.3 (06 Apr 2021 15:08), Max: 99.3 (06 Apr 2021 15:08)  HR: 98 (06 Apr 2021 15:08) (74 - 98)  BP: 131/88 (06 Apr 2021 15:08) (117/78 - 131/88)  BP(mean): --  RR: 17 (06 Apr 2021 15:08) (17 - 17)  SpO2: 94% (06 Apr 2021 15:08) (94% - 99%)    General:    NAD,  non toxic, off NC and breathing comfortably on RA  Head: atraumatic, normocephalic  Eye: normal sclera and conjunctiva  ENT:    no oral lesions, neck supple  Cardio:     regular S1, S2,  no murmur, + right sided chest tube   Respiratory:    clear b/l,    no wheezing  abd:     soft,   BS +,   no tenderness  :   no CVAT,  no suprapubic tenderness,   no  harris  Musculoskeletal:   no joint swelling,   no edema, left first toe with erythema, wamrth and tenderness with minimal movement   vascular: no central lines, +PIV   Skin:    no rash  Neurologic:     no focal deficit  psych: normal affect    WBC Count: 4.41 K/uL (04-05 @ 09:10)  WBC Count: 6.86 K/uL (04-03 @ 16:12)                            14.0   4.41  )-----------( 209      ( 05 Apr 2021 09:10 )             42.7       04-05    138  |  102  |  9   ----------------------------<  91  4.0   |  29  |  1.05    Ca    9.3      05 Apr 2021 09:10  Phos  3.0     04-05  Mg     2.1     04-05    TPro  7.3  /  Alb  2.8<L>  /  TBili  0.6  /  DBili  x   /  AST  21  /  ALT  34  /  AlkPhos  45  04-05          Creatinine Trend: 1.05<--, 1.12<--, 1.16<--, 1.09<--, 1.06<--, 0.98<--      MICROBIOLOGY:  HIV-1/2 Antigen/Antibody Screen by [facility] (04.06.21 @ 11:01)    HIV-1/2 Combo Result: Nonreact.    Ferritin, Serum: 462 (04-05)    D-Dimer Assay, Quantitative: 577 (04-05)  D-Dimer Assay, Quantitative: 464 (04-03)    Procalcitonin, Serum: 0.05 (04-05-21 @ 11:19)      RADIOLOGY:  < from: Xray Chest 1 View- PORTABLE-Urgent (Xray Chest 1 View- PORTABLE-Urgent .) (04.06.21 @ 11:45) >  IMPRESSION: Unchanged chest as above.

## 2021-04-28 ENCOUNTER — EMERGENCY (EMERGENCY)
Facility: HOSPITAL | Age: 61
LOS: 1 days | Discharge: HOME | End: 2021-04-28
Attending: EMERGENCY MEDICINE
Payer: COMMERCIAL

## 2021-04-28 VITALS
HEART RATE: 49 BPM | DIASTOLIC BLOOD PRESSURE: 72 MMHG | OXYGEN SATURATION: 98 % | TEMPERATURE: 98 F | RESPIRATION RATE: 17 BRPM | SYSTOLIC BLOOD PRESSURE: 134 MMHG

## 2021-04-28 VITALS
SYSTOLIC BLOOD PRESSURE: 112 MMHG | WEIGHT: 139.99 LBS | TEMPERATURE: 98 F | DIASTOLIC BLOOD PRESSURE: 80 MMHG | RESPIRATION RATE: 20 BRPM | OXYGEN SATURATION: 99 % | HEIGHT: 68 IN | HEART RATE: 66 BPM

## 2021-04-28 DIAGNOSIS — Z98.890 OTHER SPECIFIED POSTPROCEDURAL STATES: Chronic | ICD-10-CM

## 2021-04-28 DIAGNOSIS — Z90.49 ACQUIRED ABSENCE OF OTHER SPECIFIED PARTS OF DIGESTIVE TRACT: Chronic | ICD-10-CM

## 2021-04-28 DIAGNOSIS — K21.9 GASTRO-ESOPHAGEAL REFLUX DISEASE WITHOUT ESOPHAGITIS: Chronic | ICD-10-CM

## 2021-04-28 LAB
ALBUMIN SERPL ELPH-MCNC: 4.2 G/DL — SIGNIFICANT CHANGE UP (ref 3.3–5.2)
ALP SERPL-CCNC: 49 U/L — SIGNIFICANT CHANGE UP (ref 40–120)
ALT FLD-CCNC: 21 U/L — SIGNIFICANT CHANGE UP
ANION GAP SERPL CALC-SCNC: 12 MMOL/L — SIGNIFICANT CHANGE UP (ref 5–17)
APTT BLD: 31.5 SEC — SIGNIFICANT CHANGE UP (ref 27.5–35.5)
AST SERPL-CCNC: 20 U/L — SIGNIFICANT CHANGE UP
BASOPHILS # BLD AUTO: 0.04 K/UL — SIGNIFICANT CHANGE UP (ref 0–0.2)
BASOPHILS NFR BLD AUTO: 0.5 % — SIGNIFICANT CHANGE UP (ref 0–2)
BILIRUB SERPL-MCNC: 0.9 MG/DL — SIGNIFICANT CHANGE UP (ref 0.4–2)
BUN SERPL-MCNC: 15 MG/DL — SIGNIFICANT CHANGE UP (ref 8–20)
CALCIUM SERPL-MCNC: 9.8 MG/DL — SIGNIFICANT CHANGE UP (ref 8.6–10.2)
CHLORIDE SERPL-SCNC: 103 MMOL/L — SIGNIFICANT CHANGE UP (ref 98–107)
CK SERPL-CCNC: 106 U/L — SIGNIFICANT CHANGE UP (ref 25–170)
CO2 SERPL-SCNC: 25 MMOL/L — SIGNIFICANT CHANGE UP (ref 22–29)
CREAT SERPL-MCNC: 0.54 MG/DL — SIGNIFICANT CHANGE UP (ref 0.5–1.3)
D DIMER BLD IA.RAPID-MCNC: <150 NG/ML DDU — SIGNIFICANT CHANGE UP
EOSINOPHIL # BLD AUTO: 0.02 K/UL — SIGNIFICANT CHANGE UP (ref 0–0.5)
EOSINOPHIL NFR BLD AUTO: 0.3 % — SIGNIFICANT CHANGE UP (ref 0–6)
GLUCOSE SERPL-MCNC: 90 MG/DL — SIGNIFICANT CHANGE UP (ref 70–99)
HCG SERPL-ACNC: <4 MIU/ML — SIGNIFICANT CHANGE UP
HCT VFR BLD CALC: 39.3 % — SIGNIFICANT CHANGE UP (ref 34.5–45)
HGB BLD-MCNC: 13.1 G/DL — SIGNIFICANT CHANGE UP (ref 11.5–15.5)
IMM GRANULOCYTES NFR BLD AUTO: 0.3 % — SIGNIFICANT CHANGE UP (ref 0–1.5)
INR BLD: 1.05 RATIO — SIGNIFICANT CHANGE UP (ref 0.88–1.16)
LYMPHOCYTES # BLD AUTO: 2.69 K/UL — SIGNIFICANT CHANGE UP (ref 1–3.3)
LYMPHOCYTES # BLD AUTO: 33.8 % — SIGNIFICANT CHANGE UP (ref 13–44)
MAGNESIUM SERPL-MCNC: 2 MG/DL — SIGNIFICANT CHANGE UP (ref 1.8–2.6)
MCHC RBC-ENTMCNC: 31.2 PG — SIGNIFICANT CHANGE UP (ref 27–34)
MCHC RBC-ENTMCNC: 33.3 GM/DL — SIGNIFICANT CHANGE UP (ref 32–36)
MCV RBC AUTO: 93.6 FL — SIGNIFICANT CHANGE UP (ref 80–100)
MONOCYTES # BLD AUTO: 0.36 K/UL — SIGNIFICANT CHANGE UP (ref 0–0.9)
MONOCYTES NFR BLD AUTO: 4.5 % — SIGNIFICANT CHANGE UP (ref 2–14)
NEUTROPHILS # BLD AUTO: 4.84 K/UL — SIGNIFICANT CHANGE UP (ref 1.8–7.4)
NEUTROPHILS NFR BLD AUTO: 60.6 % — SIGNIFICANT CHANGE UP (ref 43–77)
NT-PROBNP SERPL-SCNC: 230 PG/ML — SIGNIFICANT CHANGE UP (ref 0–300)
PLATELET # BLD AUTO: 213 K/UL — SIGNIFICANT CHANGE UP (ref 150–400)
POTASSIUM SERPL-MCNC: 4 MMOL/L — SIGNIFICANT CHANGE UP (ref 3.5–5.3)
POTASSIUM SERPL-SCNC: 4 MMOL/L — SIGNIFICANT CHANGE UP (ref 3.5–5.3)
PROT SERPL-MCNC: 6.6 G/DL — SIGNIFICANT CHANGE UP (ref 6.6–8.7)
PROTHROM AB SERPL-ACNC: 12.2 SEC — SIGNIFICANT CHANGE UP (ref 10.6–13.6)
RBC # BLD: 4.2 M/UL — SIGNIFICANT CHANGE UP (ref 3.8–5.2)
RBC # FLD: 12.9 % — SIGNIFICANT CHANGE UP (ref 10.3–14.5)
SODIUM SERPL-SCNC: 140 MMOL/L — SIGNIFICANT CHANGE UP (ref 135–145)
TROPONIN T SERPL-MCNC: <0.01 NG/ML — SIGNIFICANT CHANGE UP (ref 0–0.06)
TROPONIN T SERPL-MCNC: <0.01 NG/ML — SIGNIFICANT CHANGE UP (ref 0–0.06)
WBC # BLD: 7.97 K/UL — SIGNIFICANT CHANGE UP (ref 3.8–10.5)
WBC # FLD AUTO: 7.97 K/UL — SIGNIFICANT CHANGE UP (ref 3.8–10.5)

## 2021-04-28 PROCEDURE — 85610 PROTHROMBIN TIME: CPT

## 2021-04-28 PROCEDURE — C8929: CPT

## 2021-04-28 PROCEDURE — 85730 THROMBOPLASTIN TIME PARTIAL: CPT

## 2021-04-28 PROCEDURE — 83880 ASSAY OF NATRIURETIC PEPTIDE: CPT

## 2021-04-28 PROCEDURE — 99283 EMERGENCY DEPT VISIT LOW MDM: CPT

## 2021-04-28 PROCEDURE — 85025 COMPLETE CBC W/AUTO DIFF WBC: CPT

## 2021-04-28 PROCEDURE — 85379 FIBRIN DEGRADATION QUANT: CPT

## 2021-04-28 PROCEDURE — 93306 TTE W/DOPPLER COMPLETE: CPT | Mod: 26

## 2021-04-28 PROCEDURE — 36415 COLL VENOUS BLD VENIPUNCTURE: CPT

## 2021-04-28 PROCEDURE — 80053 COMPREHEN METABOLIC PANEL: CPT

## 2021-04-28 PROCEDURE — 93005 ELECTROCARDIOGRAM TRACING: CPT

## 2021-04-28 PROCEDURE — 84702 CHORIONIC GONADOTROPIN TEST: CPT

## 2021-04-28 PROCEDURE — 99218: CPT

## 2021-04-28 PROCEDURE — 71045 X-RAY EXAM CHEST 1 VIEW: CPT

## 2021-04-28 PROCEDURE — G0378: CPT

## 2021-04-28 PROCEDURE — 83735 ASSAY OF MAGNESIUM: CPT

## 2021-04-28 PROCEDURE — 84484 ASSAY OF TROPONIN QUANT: CPT

## 2021-04-28 PROCEDURE — 99284 EMERGENCY DEPT VISIT MOD MDM: CPT | Mod: 25

## 2021-04-28 PROCEDURE — 93010 ELECTROCARDIOGRAM REPORT: CPT

## 2021-04-28 PROCEDURE — 82550 ASSAY OF CK (CPK): CPT

## 2021-04-28 PROCEDURE — 71045 X-RAY EXAM CHEST 1 VIEW: CPT | Mod: 26

## 2021-04-28 PROCEDURE — 36000 PLACE NEEDLE IN VEIN: CPT

## 2021-04-28 RX ORDER — LOSARTAN POTASSIUM 100 MG/1
25 TABLET, FILM COATED ORAL DAILY
Refills: 0 | Status: DISCONTINUED | OUTPATIENT
Start: 2021-04-28 | End: 2021-05-02

## 2021-04-28 RX ORDER — IRBESARTAN 75 MG/1
1 TABLET ORAL
Qty: 0 | Refills: 0 | DISCHARGE

## 2021-04-28 RX ORDER — SIMVASTATIN 20 MG/1
20 TABLET, FILM COATED ORAL AT BEDTIME
Refills: 0 | Status: DISCONTINUED | OUTPATIENT
Start: 2021-04-28 | End: 2021-05-02

## 2021-04-28 RX ORDER — LOSARTAN POTASSIUM 100 MG/1
0 TABLET, FILM COATED ORAL
Qty: 0 | Refills: 0 | DISCHARGE

## 2021-04-28 RX ORDER — LEVOTHYROXINE SODIUM 125 MCG
25 TABLET ORAL DAILY
Refills: 0 | Status: DISCONTINUED | OUTPATIENT
Start: 2021-04-28 | End: 2021-05-02

## 2021-04-28 RX ORDER — SODIUM CHLORIDE 9 MG/ML
1000 INJECTION INTRAMUSCULAR; INTRAVENOUS; SUBCUTANEOUS ONCE
Refills: 0 | Status: COMPLETED | OUTPATIENT
Start: 2021-04-28 | End: 2021-04-28

## 2021-04-28 RX ADMIN — SODIUM CHLORIDE 1000 MILLILITER(S): 9 INJECTION INTRAMUSCULAR; INTRAVENOUS; SUBCUTANEOUS at 17:36

## 2021-04-28 NOTE — ED CDU PROVIDER INITIAL DAY NOTE - NS ED ROS FT
General: no fever or chills  Eyes: no redness, discharge,  no visual disturbances  ENT: no nasal congestion, sore throat  Cardiac: No chest pain, palpitations, peripheral edema  Respiratory: No cough, GIL, SOB or wheeze  GI: No abdominal pain, N/V/D  : no dysuria, hematuria  Musculoskeletal: no joint pain, no back or neck pain  Neuro: No headache or dizziness  Skin: no itch or rash

## 2021-04-28 NOTE — ED CDU PROVIDER INITIAL DAY NOTE - PHYSICAL EXAMINATION
Constitutional: Awake and alert, in NAD  Eyes: clear bilaterally  Cardiac: S1S2, RR, no murmur  Resp: CTA/BL, no use of accessory muscles  GI: +BS x 4, soft, NTTP  MSK:  no bony deformity, no limited ROM  Neuro: A&Ox3, CN II-XI GI, LOZANO x4, 5/5 motors throughout, = sensation  Skin: warm and dry

## 2021-04-28 NOTE — ED ADULT TRIAGE NOTE - CHIEF COMPLAINT QUOTE
Pt states he had an episode of low BP and low HR this AM.  Pt also c/o intermittent sharp pain under left axilla.

## 2021-04-28 NOTE — ED CDU PROVIDER DISPOSITION NOTE - CARE PROVIDER_API CALL
Zia Jama)  Cardiovascular Disease  [address] [facility] [address] [facility], [place] [address]  Phone: [phone]  Fax: [phone]  Follow Up Time:

## 2021-04-28 NOTE — CONSULT NOTE ADULT - SUBJECTIVE AND OBJECTIVE BOX
[facility]                                                               Office:  [address] [address]                                                              Telephone: [phone]. Fax:[phone]                                                                        CARDIOLOGY CONSULTATION NOTE                                                                                             Consult requested by:  Dr. Urbina  Reason for Consultation: near syncope  History obtained by: Patient and medical record  PMD: Dr. Evette Jameson  Primary cardiologist: Dr. Jama   obtained: No    COVID Status: unknown    Chief complaint:    Patient is a 61y old  Female who presents with a chief complaint of near syncope, sharp left chest pain    HPI:  62yo female w/PMH HTN, HLD, hypothyroid OA presents to ED s/p near syncope and intermittent sharp left lateral chest pain beneath axilla. Patient states at baseline BP 120s/60s and HR 40s-60s, last night got the sharp chest pain, /98 HR 76 and took her Losartan 25mg per usual. This morning woke up and BP was 79/58 w/HR 54. Went to bathroom urinated and had BM, began to feel like she was going to pass out "started seeing black" so quickly went and laid on her bed, denies ever passing out, at that time BP was 63/42 w/HR 48. Follows cardiology Dr. Jama and had non-ischemic NST 2018 and nml TTE 2019, has not been seen since. Denies exertional chest pain/pressure, palpitations, irregular and/or rapid heart beat, SOB, GIL, dizziness, orthopnea, PND, cough, edema, f/c, n/v/d, hematuria, or hematochezia.     REVIEW OF SYMPTOMS:     CONSTITUTIONAL: No fever, weight loss, or fatigue  ENMT:  No difficulty hearing, tinnitus, vertigo; No sinus or throat pain  NECK: No pain or stiffness  CARDIOVASCULAR: as per HPI  RESPIRATORY: No Dyspnea on exertion, Shortness of breath, cough, wheezing  : No dysuria, no hematuria   GI: No dark color stool, no melena, no diarrhea, no constipation, no abdominal pain   NEURO: No headache, no dizziness, no slurred speech   MUSCULOSKELETAL: No joint pain or swelling; No muscle, back, or extremity pain  PSYCH: No agitation, no anxiety.    ALL OTHER REVIEW OF SYSTEMS ARE NEGATIVE.      PREVIOUS DIAGNOSTIC TESTING  no prior documentation      ALLERGIES: Allergies    No Known Allergies    Intolerances          PAST MEDICAL HISTORY  Hypothyroidism    Hypertension    Obesity    Osteopenia    Dyslipidemia    Hiatal hernia with GERD    GERD (gastroesophageal reflux disease)        PAST SURGICAL HISTORY  History of appendectomy    Status post wrist surgery    H/O repair of right rotator cuff    History of bunionectomy    Hiatal hernia with GERD        FAMILY HISTORY:  No pertinent family history in first degree relatives        SOCIAL HISTORY:    CIGARETTES: Denies  ALCOHOL: occasional  DRUGS: Denies      CURRENT MEDICATIONS:           HOME MEDICATIONS:  Losartan 25mg PO qHS  Simvastatin  synthroid  alendronate      Vital Signs Last 24 Hrs  T(C): 36.7 (28 Apr 2021 12:15), Max: 36.7 (28 Apr 2021 12:15)  T(F): 98.1 (28 Apr 2021 12:15), Max: 98.1 (28 Apr 2021 12:15)  HR: 66 (28 Apr 2021 12:15) (66 - 66)  BP: 112/80 (28 Apr 2021 12:15) (112/80 - 112/80)  BP(mean): --  RR: 20 (28 Apr 2021 12:15) (20 - 20)  SpO2: 99% (28 Apr 2021 12:15) (99% - 99%)      PHYSICAL EXAM:  Appearance: NAD, well nourished	  Neurologic: A&Ox3, PERRL, EOMI, Grossly non-focal with full strength in all four extremities  HEENT:   Normal oral mucosa, sclera non-icteric	  Lymphatic: No cervical lymphadenopathy  Cardiovascular: Normal S1 S2, No JVD, No murmur, No carotid bruits  Respiratory: Lungs clear to auscultation	  Psychiatry: Mood & affect appropriate  Gastrointestinal:  Soft, Non-tender, + BS, no bruits	  Extremities: Normal range of motion, No clubbing, cyanosis or edema  Vascular: Peripheral pulses palpable 2+ bilaterally  Skin: No Erythema, No ecchymoses, No cyanosis, No rashes  Lines and Tubes: n/a    Intake and output:     LABS:                        13.1   7.97  )-----------( 213      ( 28 Apr 2021 14:27 )             39.3     04-28    140  |  103  |  15.0  ----------------------------<  90  4.0   |  25.0  |  0.54    Ca    9.8      28 Apr 2021 14:27  Mg     2.0     04-28    TPro  6.6  /  Alb  4.2  /  TBili  0.9  /  DBili  x   /  AST  20  /  ALT  21  /  AlkPhos  49  04-28    CARDIAC MARKERS ( 28 Apr 2021 14:27 )  x     / <0.01 ng/mL / 106 U/L / x     / x        ;p-BNP=Serum Pro-Brain Natriuretic Peptide: 230 pg/mL (04-28 @ 14:27)    PT/INR - ( 28 Apr 2021 14:27 )   PT: 12.2 sec;   INR: 1.05 ratio         PTT - ( 28 Apr 2021 14:27 )  PTT:31.5 sec      INTERPRETATION OF TELEMETRY: Reviewed by me.   ECG: Reviewed by me.     RADIOLOGY & ADDITIONAL STUDIES:    X-ray:   < from: Xray Chest 1 View- PORTABLE-Urgent (Xray Chest 1 View- PORTABLE-Urgent .) (04.28.21 @ 15:18) >  IMPRESSION: No acute cardiopulmonary disease process.    < end of copied text >      CT scan:   MRI:

## 2021-04-28 NOTE — ED PROVIDER NOTE - CLINICAL SUMMARY MEDICAL DECISION MAKING FREE TEXT BOX
patient with chest pain and near syncope, 1st trop negative, will observe and do echo as per [facility] cards.

## 2021-04-28 NOTE — ED CDU PROVIDER DISPOSITION NOTE - CLINICAL COURSE
61yoF; with pmh signif for HTN, HLD, GERD, Hypothyroidism; now p/w near syncope. patient reports having chest pain yesterday--sscp, non-exertional, non-pleuritic, radiating to left lateral chest, associated with palpitations, lightheadedness. denies n/v. reports having high blood pressure at that time. this morning, awoke and had low bp 70/50. states she walked to bathroom and on the way back felt lightheadedness and almost "blacked out".  denies fall or loc. pt with negative tte, trop x 2 negative, cleared by cardio, pt can f/u with dr. Jama. advsied to hold losartan until seen in office, Pt reassessed, pt feeling better at this time, vss, pt able to walk, talk and vocalized plan of action. Discussed in depth and explained to pt in depth the next steps that need to be taking including proper follow up with PCP or specialists. All incidental findings were discussed with pt as well. Pt verbalized their concerns and all questions were answered. Pt understands dispo and wants discharge. Given good instructions when to return to ED and importance of f/u.

## 2021-04-28 NOTE — ED PROVIDER NOTE - OBJECTIVE STATEMENT
61yoF; with pmh signif for HTN, HLD, GERD, Hypothyroidism; now p/w near syncope. patient reports having chest pain yesterday--sscp, non-exertional, non-pleuritic, radiating to left lateral chest, associated with palpitations, lightheadedness. denies n/v. reports having high blood pressure at that time. this morning, awoke and had low bp. states she walked to bathroom and on the way back felt lightheadedness and almost "blacked out".  denies fall or loc.  PMH:  HTN, HLD, GERD, Hypothyroidism  SOCIAL: No tobacco/illicit substance use

## 2021-04-28 NOTE — CONSULT NOTE ADULT - ATTENDING COMMENTS
61F follows with Dr. Jama at [facility] not seen since 2019 last stress test in 2018 presents with episode of syncope after toilet use with hypotension SBP 60-70s, currently normotensive and not orthostatic, lab work normal, suspect vasovagal, new L-axilla chest discomfort, low suspicion for ACS  -check TTE and 2nd Trop if both normal then stable for discharge home to follow up outpatient  -demonstrated and educated about counter pressure maneuvers for vasovagal syncope  -recommend to hold losartan use for now until office follow up        Kwasi Reyes DO, [facility]  Faculty Non-Invasive Cardiologist  [phone]

## 2021-04-28 NOTE — CONSULT NOTE ADULT - ASSESSMENT
60yo female w/PMH HTN, HLD, hypothyroid OA presents to ED s/p near syncope and intermittent sharp left lateral chest pain beneath axilla. Patient states at baseline BP 120s/60s and HR 40s-60s, last night got the sharp chest pain, /98 HR 76 and took her Losartan 25mg per usual. This morning woke up and BP was 79/58 w/HR 54. Went to bathroom urinated and had BM, began to feel like she was going to pass out "started seeing black" so quickly went and laid on her bed, denies ever passing out, at that time BP was 63/42 w/HR 48. Follows cardiology Dr. Jama and had non-ischemic NST 2018 and nml TTE 2019, has not been seen since. Denies exertional chest pain/pressure, palpitations, irregular and/or rapid heart beat, SOB, GIL, dizziness, orthopnea, PND, cough, edema, f/c, n/v/d, hematuria, or hematochezia.     Atypical chest pain  -EKG w/o acute ischemia  -Trop neg x1, repeat   -TTE pending  -pending above findings w/o significant abnormalities patient may f/u w/Dr. Jama for further outpatient w/u    near syncope  -likely vasovagal  -TTE pending  -instructed patient to lay flat with legs elevated or tighten all muscles if near syncopal symptoms recur       Samara Salcido NP  [phone]     Assessment and recommendations are final when note is signed by the attending.

## 2021-04-28 NOTE — ED CDU PROVIDER INITIAL DAY NOTE - ATTENDING CONTRIBUTION TO CARE
I, Hamlet Arthur, have personally performed a face to face diagnostic evaluation on this patient. I have reviewed the ACP note and agree with the history, exam and plan of care, except as noted.    62 yo F hx of HTN, HDL, GERD, hyothyroids p/w chest pain and near syncope. patient report her BP was low and felt lightheadedness. EKG without ischemic changes. trop negative. patient see by cardiology, recommend TTE and serial trop.

## 2021-04-28 NOTE — ED CDU PROVIDER INITIAL DAY NOTE - OBJECTIVE STATEMENT
61yoF; with pmh signif for HTN, HLD, GERD, Hypothyroidism; now p/w near syncope. patient reports having chest pain yesterday--sscp, non-exertional, non-pleuritic, radiating to left lateral chest, associated with palpitations, lightheadedness. denies n/v. reports having high blood pressure at that time. this morning, awoke and had low bp 70/50. states she walked to bathroom and on the way back felt lightheadedness and almost "blacked out".  denies fall or loc.  PMH:  HTN, HLD, GERD, Hypothyroidism  SOCIAL: No tobacco/illicit substance use  Labs unremarkable, troponin negative, EKG no ischemia, CXR no acute process. Seen by cardiology, serial troponin, TTE.

## 2021-04-28 NOTE — ED PROVIDER NOTE - NS ED ROS FT
Constitutional: (-) fever  (-)chills  (-)sweats  Eyes/ENT: (-)   Cardiovascular: (+) chest pain, (+) palpitations (-) edema   Respiratory: (-) cough, (-) shortness of breath   Gastrointestinal: (-)nausea  (-)vomiting, (-) diarrhea  (-) abdominal pain   :  (-)dysuria, (-)frequency, (-)urgency, (-)hematuria  Musculoskeletal: (-) neck pain, (-) back pain, (-) joint pain  Integumentary: (-) rash, (-) edema  Neurological: (-) headache, (-) altered mental status  (-)LOC

## 2021-04-28 NOTE — ED ADULT NURSE NOTE - OBJECTIVE STATEMENT
Pt reports waking up and going to the bathroom and after having bowel movement "I got sweaty and was lightheaded and my blood pressure was 69/33", pt reports having high heart rate of 76 last night "when I'm usually in the 50's", pt denies dizziness/lightheadedness at this time, showing NSR on monitor, denies chest pain

## 2021-04-28 NOTE — ED CDU PROVIDER DISPOSITION NOTE - NSFOLLOWUPINSTRUCTIONS_ED_ALL_ED_FT
Chest Pain    Chest pain can be caused by many different conditions which may or may not be dangerous. Causes include heartburn, lung infections, heart attack, blood clot in lungs, skin infections, strain or damage to muscle, cartilage, or bones, etc. In addition to a history and physical examination, an electrocardiogram (ECG) or other lab tests may have been performed to determine the cause of your chest pain. Follow up with your primary care provider or with a cardiologist as instructed.     SEEK IMMEDIATE MEDICAL CARE IF YOU HAVE ANY OF THE FOLLOWING SYMPTOMS: worsening chest pain, coughing up blood, unexplained back/neck/jaw pain, severe abdominal pain, dizziness or lightheadedness, fainting, shortness of breath, sweaty or clammy skin, vomiting, or racing heart beat. These symptoms may represent a serious problem that is an emergency. Do not wait to see if the symptoms will go away. Get medical help right away. Call 911 and do not drive yourself to the hospital.     Syncope    Syncope is when you temporarily lose consciousness, also called fainting or passing out. It is caused by a sudden decrease in blood flow to the brain. Even though most causes of syncope are not dangerous, syncope can possibly be a sign of a serious medical problem. Signs that you may be about to faint include feeling dizzy, lightheaded, nausea, visual changes, or cold/clammy skin. Do not drive, operate heavy machinery, or play sports until your health care provider says it is okay.    SEEK IMMEDIATE MEDICAL CARE IF YOU HAVE ANY OF THE FOLLOWING SYMPTOMS: severe headache, pain in your chest/abdomen/back, bleeding from your mouth or rectum, palpitations, shortness of breath, pain with breathing, seizure, confusion, or trouble walking.    1) Please follow-up with your primary care doctor in the next 5-7 days.  Please call tomorrow for an appointment.  If you cannot follow-up with your primary care doctor please return to the ED for any urgent issues.  2) You were given a copy of the tests performed today.  Please bring the results with you and review them with your primary care doctor.  3) If you have any worsening of symptoms or any other concerns please return to the ED immediately.  4) Please continue taking your home medications as directed.   5) Please refrain from taking losartan until you see Dr. Jama

## 2021-04-28 NOTE — ED CDU PROVIDER DISPOSITION NOTE - PATIENT PORTAL LINK FT
You can access the FollowMyHealth Patient Portal offered by [facility] by registering at the following website: [email or web address]. By joining [employer]’s FollowMyHealth portal, you will also be able to view your health information using other applications (apps) compatible with our system. You can access the FollowMyHealth Patient Portal offered by [facility] by registering at the following website: [email or web address]. By joining [employer]’s FollowMyHealth portal, you will also be able to view your health information using other applications (apps) compatible with our system. You can access the FollowMyHealth Patient Portal offered by [facility] by registering at the following website: [email or web address]. By joining [employer]’s FollowMyHealth portal, you will also be able to view your health information using other applications (apps) compatible with our system.

## 2021-04-28 NOTE — ED ADULT NURSE REASSESSMENT NOTE - NS ED NURSE REASSESS COMMENT FT1
Assumed care of the Pt at 1930. verbal report received from ARGELIA Pena. Pt is AOx4 in no acute distress. Pt Denies any chest pain, shortness of breath, nausea or dizziness at this time. Pt with no complaints for RN Pt VSS, PIV patent. Pt is sinus nelson on monitor as per monitor tech. pt is pending rpt EKG and Trop for DC. Pt in understanding of plan of care wait time explained, plan of care explained, will continue to monitor.
Assumed care of the patient at 1830. Verbal report received from [facility]. Patient transferred to observation unit CDU 10. Patient A&Ox4. No s/s of acute distress or pain. Denies CP/SOB or dizziness. Sinus nelson on CM. PIV patent. VSS. Patient pending rpt trop/ekg and TTE testing. Diet office called for tray. Patient ambulatory with steady gait. Remains asymptomatic while ambulating. Patient in understanding of plan of care. Patient with no further questions for the RN. Resting in comfort. Call bell within reach and encouraged to use when assistance needed. Will continue to monitor.

## 2021-04-28 NOTE — ED PROVIDER NOTE - PRO INTERPRETER NEED 2
Called patient after portal message wasn't read. Left message for patient to call clinic.    English

## 2021-04-28 NOTE — ED CDU PROVIDER DISPOSITION NOTE - ATTENDING CONTRIBUTION TO CARE
I, Hamlet Arthur, have personally performed a face to face diagnostic evaluation on this patient. I have reviewed the ACP note and agree with the history, exam and plan of care, except as noted.    62 yo F hx of HTN, HDL, GERD, hyothyroids p/w chest pain and near syncope. patient report her BP was low and felt lightheadedness. EKG without ischemic changes. trop negative x 3. patient seen by cardiology, echo wnl. medication adjusted by cardiology.

## 2021-06-16 ENCOUNTER — APPOINTMENT (OUTPATIENT)
Dept: PULMONOLOGY | Facility: CLINIC | Age: 61
End: 2021-06-16
Payer: COMMERCIAL

## 2021-06-16 VITALS — WEIGHT: 150 LBS | BODY MASS INDEX: 23.54 KG/M2 | HEIGHT: 67 IN

## 2021-06-16 VITALS — OXYGEN SATURATION: 97 % | HEART RATE: 50 BPM

## 2021-06-16 VITALS — SYSTOLIC BLOOD PRESSURE: 120 MMHG | DIASTOLIC BLOOD PRESSURE: 70 MMHG

## 2021-06-16 DIAGNOSIS — Z86.39 PERSONAL HISTORY OF OTHER ENDOCRINE, NUTRITIONAL AND METABOLIC DISEASE: ICD-10-CM

## 2021-06-16 DIAGNOSIS — Z63.5 DISRUPTION OF FAMILY BY SEPARATION AND DIVORCE: ICD-10-CM

## 2021-06-16 PROCEDURE — 99204 OFFICE O/P NEW MOD 45 MIN: CPT

## 2021-06-16 RX ORDER — RANITIDINE 150 MG/1
150 TABLET ORAL
Refills: 0 | Status: DISCONTINUED | COMMUNITY
End: 2021-06-16

## 2021-06-16 RX ORDER — MELOXICAM 7.5 MG/1
7.5 TABLET ORAL TWICE DAILY
Qty: 30 | Refills: 1 | Status: DISCONTINUED | COMMUNITY
Start: 2018-02-12 | End: 2021-06-16

## 2021-06-16 RX ORDER — NAPROXEN 500 MG/1
500 TABLET ORAL
Qty: 60 | Refills: 0 | Status: DISCONTINUED | COMMUNITY
Start: 2017-09-05 | End: 2021-06-16

## 2021-06-16 RX ORDER — ALENDRONATE SODIUM 70 MG/1
70 TABLET ORAL
Qty: 12 | Refills: 0 | Status: DISCONTINUED | COMMUNITY
Start: 2017-04-21 | End: 2021-06-16

## 2021-06-16 RX ORDER — FENOFIBRATE 145 MG/1
145 TABLET, COATED ORAL
Qty: 30 | Refills: 0 | Status: DISCONTINUED | COMMUNITY
Start: 2017-06-16 | End: 2021-06-16

## 2021-06-16 RX ORDER — HYALURONATE SODIUM 20 MG/2 ML
20 SYRINGE (ML) INTRAARTICULAR
Qty: 6 | Refills: 0 | Status: DISCONTINUED | OUTPATIENT
Start: 2018-02-12 | End: 2021-06-16

## 2021-06-16 SDOH — SOCIAL STABILITY - SOCIAL INSECURITY: DISRUPTION OF FAMILY BY SEPARATION AND DIVORCE: Z63.5

## 2021-06-16 NOTE — PHYSICAL EXAM
[No Acute Distress] : no acute distress [Well Nourished] : well nourished [Well Developed] : well developed [Low Lying Soft Palate] : low lying soft palate [Enlarged Base of the Tongue] : enlarged base of the tongue [III] : Mallampati Class: III [Normal Appearance] : normal appearance [Normal Rate/Rhythm] : normal rate/rhythm [Normal S1, S2] : normal s1, s2 [No Murmurs] : no murmurs [No Resp Distress] : no resp distress [No Acc Muscle Use] : no acc muscle use [Normal Rhythm and Effort] : normal rhythm and effort [Clear to Auscultation Bilaterally] : clear to auscultation bilaterally [No Abnormalities] : no abnormalities [Benign] : benign [Not Tender] : not tender [Soft] : soft [Normal Gait] : normal gait [No Clubbing] : no clubbing [No Edema] : no edema [No Focal Deficits] : no focal deficits [Oriented x3] : oriented x3 [TextBox_11] : Moderate [TextBox_54] : Bradycardia

## 2021-06-16 NOTE — CONSULT LETTER
[Dear  ___] : Dear  [unfilled], [Consult Letter:] : I had the pleasure of evaluating your patient, [unfilled]. [Please see my note below.] : Please see my note below. [Consult Closing:] : Thank you very much for allowing me to participate in the care of this patient.  If you have any questions, please do not hesitate to contact me. [Sincerely,] : Sincerely, [FreeTextEntry3] : Johan Juan MD, FCCP, D. ABSM\par Pulmonary and Sleep Medicine\par [facility] Pulmonary and Sleep Medicine at [place]

## 2021-06-16 NOTE — REASON FOR VISIT
[Initial] : an initial visit [Abnormal CXR/ Chest CT] : an abnormal CXR/ chest CT [Sleep Apnea] : sleep apnea [Pulmonary Nodules] : pulmonary nodules [TextBox_44] : Smoking hx

## 2021-06-16 NOTE — REVIEW OF SYSTEMS
[Palpitations] : palpitations [Syncope] : syncope [Seasonal Allergies] : seasonal allergies [Thyroid Problem] : thyroid problem [Fever] : no fever [Chills] : no chills [Nasal Congestion] : no nasal congestion [Postnasal Drip] : no postnasal drip [Cough] : no cough [Sinus Problems] : no sinus problems [Chest Tightness] : no chest tightness [Sputum] : no sputum [Dyspnea] : no dyspnea [Pleuritic Pain] : no pleuritic pain [Wheezing] : no wheezing [SOB on Exertion] : no sob on exertion [Chest Discomfort] : no chest discomfort [Edema] : no edema [GERD] : no gerd [Abdominal Pain] : no abdominal pain [Nausea] : no nausea [Vomiting] : no vomiting [Diarrhea] : no diarrhea [Constipation] : no constipation [Back Pain] : no back pain [Anemia] : no anemia [Headache] : no headache [Seizures] : no seizures [Dizziness] : no dizziness [Numbness] : no numbness [Paralysis] : no paralysis [Confusion] : no confusion [Depression] : no depression [Anxiety] : no anxiety [Diabetes] : no diabetes

## 2021-06-16 NOTE — DISCUSSION/SUMMARY
[FreeTextEntry1] : \par #1. Will schedule PFTs in near future to assess lung function \par #2. The patient does not appear to require chronic BD therapy at this time\par #3. Diet and exercise for weight loss\par #4. SOBOE is likely related to weight or deconditioning \par #5. Chest CT for lung cancer screening given smoking hx. Risks and benefits regarding screening CT discussed including but not limited to the benefit of early lung cancer detection as well as other possible unknown pathology but also risk of discovering nodules or other findings which may be benign but will require periodic evaluation. Current CT with with small calcified and noncalcified nodules; Repeat in one year (3/2022)\par #6. PSG to evaluate for possible XAVI. Consider HST if not approved. \par #7. Pt had both Covid vaccines \par #8. F/u after PSG and with PFTs given prior smoking hx\par #9. Reviewed risks of exposure and symptoms of Covid-19 virus, including how the virus is spread and precautions to avoid jenelle virus.\par \par Patient's questions were answered and patient appears to understand these recommendations

## 2021-06-16 NOTE — HISTORY OF PRESENT ILLNESS
[Former] : former [Never] : never [On ___] : performed on [unfilled] [Initial Evaluation] : an initial evaluation of [Excessive Daytime Sleepiness] : excessive daytime sleepiness [Unrefreshing Sleep] : unrefreshing sleep [Sleepy When Sedentary] : sleepy when sedentary [Currently Experiencing] : The patient is currently experiencing symptoms. [None] : The patient is not currently being treated for this problem [TextBox_4] : Pt reports a syncopal episode of unclear etiology though she reportedly was hypotensive at that time. She has chronic bradycardia and relative hypotension. \par She denies significant respiratory complaints.  [TextBox_11] : 2 [TextBox_13] : 35 [YearQuit] : 2011 [Patient] : the patient [Indication ___] : for an indication of [unfilled] [FreeTextEntry8] : Small PASCALE 3 mm nodule and RLL 2 mm calcified nodule otherwise clear [FreeTextEntry9] : Chest CT

## 2021-07-08 ENCOUNTER — APPOINTMENT (OUTPATIENT)
Age: 61
End: 2021-07-08

## 2021-07-20 ENCOUNTER — RESULT REVIEW (OUTPATIENT)
Age: 61
End: 2021-07-20

## 2021-08-06 ENCOUNTER — APPOINTMENT (OUTPATIENT)
Dept: PULMONOLOGY | Facility: CLINIC | Age: 61
End: 2021-08-06
Payer: COMMERCIAL

## 2021-08-06 VITALS — OXYGEN SATURATION: 97 % | HEART RATE: 50 BPM | DIASTOLIC BLOOD PRESSURE: 60 MMHG | SYSTOLIC BLOOD PRESSURE: 120 MMHG

## 2021-08-06 VITALS — BODY MASS INDEX: 23.14 KG/M2 | HEIGHT: 66 IN | WEIGHT: 144 LBS

## 2021-08-06 PROCEDURE — 94729 DIFFUSING CAPACITY: CPT

## 2021-08-06 PROCEDURE — 94010 BREATHING CAPACITY TEST: CPT

## 2021-08-06 PROCEDURE — 85018 HEMOGLOBIN: CPT | Mod: QW

## 2021-08-06 PROCEDURE — 94727 GAS DIL/WSHOT DETER LNG VOL: CPT

## 2021-08-06 PROCEDURE — 99214 OFFICE O/P EST MOD 30 MIN: CPT | Mod: 25

## 2021-08-06 NOTE — REASON FOR VISIT
[Abnormal CXR/ Chest CT] : an abnormal CXR/ chest CT [Sleep Apnea] : sleep apnea [Pulmonary Nodules] : pulmonary nodules [Follow-Up] : a follow-up visit [TextBox_44] : Smoking hx

## 2021-08-06 NOTE — CONSULT LETTER
[Dear  ___] : Dear  [unfilled], [Consult Letter:] : I had the pleasure of evaluating your patient, [unfilled]. [Please see my note below.] : Please see my note below. [Consult Closing:] : Thank you very much for allowing me to participate in the care of this patient.  If you have any questions, please do not hesitate to contact me. [Sincerely,] : Sincerely, [FreeTextEntry3] : Johan Juan MD, FCCP, D. ABSM\par Pulmonary and Sleep Medicine\par [facility] Pulmonary and Sleep Medicine at [place]

## 2021-08-06 NOTE — REVIEW OF SYSTEMS
[Palpitations] : palpitations [Syncope] : syncope [Seasonal Allergies] : seasonal allergies [Thyroid Problem] : thyroid problem [Fever] : no fever [Chills] : no chills [Nasal Congestion] : no nasal congestion [Postnasal Drip] : no postnasal drip [Sinus Problems] : no sinus problems [Cough] : no cough [Chest Tightness] : no chest tightness [Sputum] : no sputum [Dyspnea] : no dyspnea [Pleuritic Pain] : no pleuritic pain [Wheezing] : no wheezing [SOB on Exertion] : no sob on exertion [Chest Discomfort] : no chest discomfort [Edema] : no edema [GERD] : no gerd [Abdominal Pain] : no abdominal pain [Nausea] : no nausea [Vomiting] : no vomiting [Diarrhea] : no diarrhea [Constipation] : no constipation [Back Pain] : no back pain [Anemia] : no anemia [Headache] : no headache [Seizures] : no seizures [Dizziness] : no dizziness [Numbness] : no numbness [Paralysis] : no paralysis [Confusion] : no confusion [Depression] : no depression [Anxiety] : no anxiety [Diabetes] : no diabetes

## 2021-08-06 NOTE — DISCUSSION/SUMMARY
[FreeTextEntry1] : \par #1. PFTs performed today are essentially normal; follow periodically\par #2. The patient does not appear to require chronic BD therapy at this time\par #3. SOBOE is likely related to weight or deconditioning given normal PFTs\par #4. Diet and exercise for weight loss \par #5. Chest CT for lung cancer screening given smoking hx. Risks and benefits regarding screening CT discussed including but not limited to the benefit of early lung cancer detection as well as other possible unknown pathology but also risk of discovering nodules or other findings which may be benign but will require periodic evaluation. Current CT with with small calcified and noncalcified nodules; Repeat in one year (3/2022)\par #6. Repeat HST given prior attempt was not successful as there was no recording\par #7. Pt had both Covid vaccines \par #8. F/u after HST\par #9. Reviewed risks of exposure and symptoms of Covid-19 virus, including how the virus is spread and precautions to avoid jenelle virus.\par \par Patient's questions were answered and patient appears to understand these recommendations

## 2021-08-06 NOTE — HISTORY OF PRESENT ILLNESS
[Former] : former [Never] : never [On ___] : performed on [unfilled] [Patient] : the patient [Indication ___] : for an indication of [unfilled] [Initial Evaluation] : an initial evaluation of [Excessive Daytime Sleepiness] : excessive daytime sleepiness [Unrefreshing Sleep] : unrefreshing sleep [Sleepy When Sedentary] : sleepy when sedentary [Currently Experiencing] : The patient is currently experiencing symptoms. [None] : The patient is not currently being treated for this problem [TextBox_4] : Pt reports a syncopal episode of unclear etiology though she reportedly was hypotensive at that time. She has chronic bradycardia and relative hypotension. \par She denies significant respiratory complaints. \par She is a former 70 pk yr smoker but quit in 2011.\par  [TextBox_11] : 2 [TextBox_13] : 35 [YearQuit] : 2011 [FreeTextEntry9] : Chest CT  [FreeTextEntry8] : Small PASCALE 3 mm nodule and RLL 2 mm calcified nodule otherwise clear

## 2021-08-18 ENCOUNTER — APPOINTMENT (OUTPATIENT)
Dept: UROLOGY | Facility: CLINIC | Age: 61
End: 2021-08-18
Payer: COMMERCIAL

## 2021-08-18 VITALS — SYSTOLIC BLOOD PRESSURE: 131 MMHG | OXYGEN SATURATION: 98 % | HEART RATE: 47 BPM | DIASTOLIC BLOOD PRESSURE: 78 MMHG

## 2021-08-18 DIAGNOSIS — N28.1 CYST OF KIDNEY, ACQUIRED: ICD-10-CM

## 2021-08-18 DIAGNOSIS — N13.30 UNSPECIFIED HYDRONEPHROSIS: ICD-10-CM

## 2021-08-18 PROCEDURE — 99204 OFFICE O/P NEW MOD 45 MIN: CPT

## 2021-08-18 NOTE — ASSESSMENT
[FreeTextEntry1] : Reviewed outside records on [facility] and [facility] Radiology portal. \par \par Microhematuria:\par Discussed the differential diagnosis of hematuria including benign and malignant pathology- including but not limited to nephrolithiasis, bladder stone, urinary tract infection, glomerular disease, renal cancer, bladder cancer. \par Also discussed the chance that workup will not reveal a source for the bleeding. The patient understands that the hematuria could be from an upper tract (kidney or ureter) or lower tract (bladder, urethra) and that workup includes imaging and direct visualization of all of these.\par \par Will proceed with work up with Urinalysis with microscopy, Urine culture, Urine cytology. \par Wants to hold off on Cystoscopy. \par \par Renal cyst:\par Discussed that Renal cysts are a common finding on routine radiological studies. Autopsy studies in patients over the age of 50 reveal greater than a 50% chance of having at least one simple renal cyst.\par And that renal cysts may be classified as simple or complex depending how they look on imaging. Discussed complexity of renal cysts and its implications as regards malignancy. \par \par Kidney stone:\par Small non obstructing right kidney stone. No active intervention at this point. \par Recommended Kidney stone prevention diet:\par Good oral hydration so that urine is clear to light yellow, usually 1.5 to 2 Liters of fluids, mainly water\par Increasing Citrate in diet by consuming citrus fruits and juices- dinorah, limes, oranges, grapefruits and berries \par Less red meat\par Less salt\par Limit foods with oxalate like- dark green vegetables, rhubarb, chocolate, wheat bran, nuts, cranberries, and beans\par \par Recurrent UTIs:\par Recommended: good oral hydration, timed voiding, urinate right after sex, change sanitary pads often, avoid douches, bubble baths and other feminine hygiene products. \par \par Hydronephrosis:\par Slight kink in the course of upper ureter and mild hydronephrosis. \par Will follow official CT Urogram report. \par  \par Will inform results. \par \par \par

## 2021-08-18 NOTE — HISTORY OF PRESENT ILLNESS
[FreeTextEntry1] : 62 yo female presents for Renal cyst. \par Recently had passing out episode and during work up was found to have renal cyst and kidney stone. \par Today had CT Urogram. \par Daytime frequency is every 2 hours or so. Nocturia of 2-4 x. \par Endorses urgency and incontinence with cough and sneeze.\par Denies dysuria, fever, chills or rigors.\par Has off and on right flank pain, 2-3/10. \par Has been told in the past has Microhematuria. No Urologic work up. \par Past smoker. 2 PPD for 35 years. Quit 9 years ago. \par Has history of recurrent urinary tract infections 3-4 years ago, saw gross hematuria. \par Has been taking Cranberry, no new urinary tract infection in a long time. \par

## 2021-08-18 NOTE — LETTER BODY
[Dear  ___] : Dear  [unfilled], [Consult Letter:] : I had the pleasure of evaluating your patient, [unfilled]. [( Thank you for referring [unfilled] for consultation for _____ )] : Thank you for referring [unfilled] for consultation for [unfilled] [Please see my note below.] : Please see my note below. [Consult Closing:] : Thank you very much for allowing me to participate in the care of this patient.  If you have any questions, please do not hesitate to contact me. [Sincerely,] : Sincerely, [FreeTextEntry3] : Juan Gooden MD\par  of Urology\par [facility]\par \par Offices:\par [facility] @\par [address], [place] [address]\par and\par [address], [place] [address], [address]\par and\par [address] [address]\par \par TEL: [phone]\par FAX: [phone]

## 2021-08-18 NOTE — REVIEW OF SYSTEMS
[Palpitations] : palpitations [Loss of interest] : loss of interest in sexual activity [Urine Infection (bladder/kidney)] : bladder/kidney infection [Blood in urine that you can see] : blood visible in urine [Told you have blood in urine on a urine test] : told blood was present in a urine test [Urine retention] : urine retention [Wake up at night to urinate  How many times?  ___] : wakes up to urinate [unfilled] times during the night [Strong urge to urinate] : strong urge to urinate [Strain or push to urinate] : strain or push to urinate [Wait a long time to urinate] : waits a long time to urinate [Slow urine stream] : slow urine stream [Interrupted urine stream] : interrupted urine stream [Leakage of urine with straining, coughing, laughing] : leakage of urine with straining, coughing, laughing [Joint Pain] : joint pain [Negative] : Heme/Lymph

## 2021-08-19 LAB
APPEARANCE: CLEAR
BACTERIA: NEGATIVE
BILIRUBIN URINE: NEGATIVE
BLOOD URINE: NORMAL
COLOR: NORMAL
GLUCOSE QUALITATIVE U: NEGATIVE
HYALINE CASTS: 0 /LPF
KETONES URINE: NEGATIVE
LEUKOCYTE ESTERASE URINE: NEGATIVE
MICROSCOPIC-UA: NORMAL
NITRITE URINE: NEGATIVE
PH URINE: 7
PROTEIN URINE: NEGATIVE
RED BLOOD CELLS URINE: 0 /HPF
SPECIFIC GRAVITY URINE: 1.02
SQUAMOUS EPITHELIAL CELLS: 0 /HPF
UROBILINOGEN URINE: NORMAL
WHITE BLOOD CELLS URINE: 0 /HPF

## 2021-08-20 ENCOUNTER — APPOINTMENT (OUTPATIENT)
Age: 61
End: 2021-08-20
Payer: COMMERCIAL

## 2021-08-20 ENCOUNTER — OUTPATIENT (OUTPATIENT)
Dept: OUTPATIENT SERVICES | Facility: HOSPITAL | Age: 61
LOS: 1 days | End: 2021-08-20
Payer: COMMERCIAL

## 2021-08-20 DIAGNOSIS — K21.9 GASTRO-ESOPHAGEAL REFLUX DISEASE WITHOUT ESOPHAGITIS: Chronic | ICD-10-CM

## 2021-08-20 DIAGNOSIS — Z98.890 OTHER SPECIFIED POSTPROCEDURAL STATES: Chronic | ICD-10-CM

## 2021-08-20 DIAGNOSIS — G47.33 OBSTRUCTIVE SLEEP APNEA (ADULT) (PEDIATRIC): ICD-10-CM

## 2021-08-20 DIAGNOSIS — Z90.49 ACQUIRED ABSENCE OF OTHER SPECIFIED PARTS OF DIGESTIVE TRACT: Chronic | ICD-10-CM

## 2021-08-20 LAB
BACTERIA UR CULT: NORMAL
URINE CYTOLOGY: NORMAL

## 2021-08-20 PROCEDURE — 95806 SLEEP STUDY UNATT&RESP EFFT: CPT | Mod: 26

## 2021-08-20 PROCEDURE — 95806 SLEEP STUDY UNATT&RESP EFFT: CPT

## 2021-09-02 ENCOUNTER — OUTPATIENT (OUTPATIENT)
Dept: OUTPATIENT SERVICES | Facility: HOSPITAL | Age: 61
LOS: 1 days | End: 2021-09-02
Payer: COMMERCIAL

## 2021-09-02 DIAGNOSIS — Z98.890 OTHER SPECIFIED POSTPROCEDURAL STATES: Chronic | ICD-10-CM

## 2021-09-02 DIAGNOSIS — K21.9 GASTRO-ESOPHAGEAL REFLUX DISEASE WITHOUT ESOPHAGITIS: Chronic | ICD-10-CM

## 2021-09-02 DIAGNOSIS — Z90.49 ACQUIRED ABSENCE OF OTHER SPECIFIED PARTS OF DIGESTIVE TRACT: Chronic | ICD-10-CM

## 2021-09-02 DIAGNOSIS — R00.1 BRADYCARDIA, UNSPECIFIED: ICD-10-CM

## 2021-09-02 PROCEDURE — 93016 CV STRESS TEST SUPVJ ONLY: CPT

## 2021-09-02 PROCEDURE — 93018 CV STRESS TEST I&R ONLY: CPT

## 2021-09-09 ENCOUNTER — APPOINTMENT (OUTPATIENT)
Dept: PULMONOLOGY | Facility: CLINIC | Age: 61
End: 2021-09-09
Payer: COMMERCIAL

## 2021-09-09 PROCEDURE — 99442: CPT

## 2021-10-20 ENCOUNTER — NON-APPOINTMENT (OUTPATIENT)
Age: 61
End: 2021-10-20

## 2021-12-13 ENCOUNTER — NON-APPOINTMENT (OUTPATIENT)
Age: 61
End: 2021-12-13

## 2021-12-13 ENCOUNTER — EMERGENCY (EMERGENCY)
Facility: HOSPITAL | Age: 61
LOS: 1 days | Discharge: HOME | End: 2021-12-13
Attending: EMERGENCY MEDICINE
Payer: COMMERCIAL

## 2021-12-13 VITALS
HEART RATE: 58 BPM | SYSTOLIC BLOOD PRESSURE: 124 MMHG | OXYGEN SATURATION: 96 % | TEMPERATURE: 98 F | RESPIRATION RATE: 18 BRPM | HEIGHT: 68 IN | WEIGHT: 145.06 LBS | DIASTOLIC BLOOD PRESSURE: 84 MMHG

## 2021-12-13 DIAGNOSIS — Z98.890 OTHER SPECIFIED POSTPROCEDURAL STATES: Chronic | ICD-10-CM

## 2021-12-13 DIAGNOSIS — Z90.49 ACQUIRED ABSENCE OF OTHER SPECIFIED PARTS OF DIGESTIVE TRACT: Chronic | ICD-10-CM

## 2021-12-13 DIAGNOSIS — K21.9 GASTRO-ESOPHAGEAL REFLUX DISEASE WITHOUT ESOPHAGITIS: Chronic | ICD-10-CM

## 2021-12-13 LAB
ALBUMIN SERPL ELPH-MCNC: 4.5 G/DL — SIGNIFICANT CHANGE UP (ref 3.3–5.2)
ALP SERPL-CCNC: 51 U/L — SIGNIFICANT CHANGE UP (ref 40–120)
ALT FLD-CCNC: 22 U/L — SIGNIFICANT CHANGE UP
ANION GAP SERPL CALC-SCNC: 12 MMOL/L — SIGNIFICANT CHANGE UP (ref 5–17)
APPEARANCE UR: CLEAR — SIGNIFICANT CHANGE UP
AST SERPL-CCNC: 24 U/L — SIGNIFICANT CHANGE UP
BACTERIA # UR AUTO: ABNORMAL
BASOPHILS # BLD AUTO: 0.05 K/UL — SIGNIFICANT CHANGE UP (ref 0–0.2)
BASOPHILS NFR BLD AUTO: 0.7 % — SIGNIFICANT CHANGE UP (ref 0–2)
BILIRUB SERPL-MCNC: 0.9 MG/DL — SIGNIFICANT CHANGE UP (ref 0.4–2)
BILIRUB UR-MCNC: NEGATIVE — SIGNIFICANT CHANGE UP
BUN SERPL-MCNC: 11.3 MG/DL — SIGNIFICANT CHANGE UP (ref 8–20)
CALCIUM SERPL-MCNC: 9.7 MG/DL — SIGNIFICANT CHANGE UP (ref 8.6–10.2)
CHLORIDE SERPL-SCNC: 102 MMOL/L — SIGNIFICANT CHANGE UP (ref 98–107)
CO2 SERPL-SCNC: 26 MMOL/L — SIGNIFICANT CHANGE UP (ref 22–29)
COLOR SPEC: YELLOW — SIGNIFICANT CHANGE UP
CREAT SERPL-MCNC: 0.47 MG/DL — LOW (ref 0.5–1.3)
DIFF PNL FLD: ABNORMAL
EOSINOPHIL # BLD AUTO: 0.06 K/UL — SIGNIFICANT CHANGE UP (ref 0–0.5)
EOSINOPHIL NFR BLD AUTO: 0.9 % — SIGNIFICANT CHANGE UP (ref 0–6)
EPI CELLS # UR: SIGNIFICANT CHANGE UP
GLUCOSE SERPL-MCNC: 89 MG/DL — SIGNIFICANT CHANGE UP (ref 70–99)
GLUCOSE UR QL: NEGATIVE MG/DL — SIGNIFICANT CHANGE UP
HCT VFR BLD CALC: 39.4 % — SIGNIFICANT CHANGE UP (ref 34.5–45)
HGB BLD-MCNC: 13.2 G/DL — SIGNIFICANT CHANGE UP (ref 11.5–15.5)
IMM GRANULOCYTES NFR BLD AUTO: 0.1 % — SIGNIFICANT CHANGE UP (ref 0–1.5)
KETONES UR-MCNC: NEGATIVE — SIGNIFICANT CHANGE UP
LEUKOCYTE ESTERASE UR-ACNC: NEGATIVE — SIGNIFICANT CHANGE UP
LYMPHOCYTES # BLD AUTO: 2.35 K/UL — SIGNIFICANT CHANGE UP (ref 1–3.3)
LYMPHOCYTES # BLD AUTO: 33.8 % — SIGNIFICANT CHANGE UP (ref 13–44)
MCHC RBC-ENTMCNC: 31.4 PG — SIGNIFICANT CHANGE UP (ref 27–34)
MCHC RBC-ENTMCNC: 33.5 GM/DL — SIGNIFICANT CHANGE UP (ref 32–36)
MCV RBC AUTO: 93.6 FL — SIGNIFICANT CHANGE UP (ref 80–100)
MONOCYTES # BLD AUTO: 0.49 K/UL — SIGNIFICANT CHANGE UP (ref 0–0.9)
MONOCYTES NFR BLD AUTO: 7 % — SIGNIFICANT CHANGE UP (ref 2–14)
NEUTROPHILS # BLD AUTO: 4 K/UL — SIGNIFICANT CHANGE UP (ref 1.8–7.4)
NEUTROPHILS NFR BLD AUTO: 57.5 % — SIGNIFICANT CHANGE UP (ref 43–77)
NITRITE UR-MCNC: NEGATIVE — SIGNIFICANT CHANGE UP
PH UR: 7 — SIGNIFICANT CHANGE UP (ref 5–8)
PLATELET # BLD AUTO: 255 K/UL — SIGNIFICANT CHANGE UP (ref 150–400)
POTASSIUM SERPL-MCNC: 4.3 MMOL/L — SIGNIFICANT CHANGE UP (ref 3.5–5.3)
POTASSIUM SERPL-SCNC: 4.3 MMOL/L — SIGNIFICANT CHANGE UP (ref 3.5–5.3)
PROT SERPL-MCNC: 7 G/DL — SIGNIFICANT CHANGE UP (ref 6.6–8.7)
PROT UR-MCNC: NEGATIVE — SIGNIFICANT CHANGE UP
RBC # BLD: 4.21 M/UL — SIGNIFICANT CHANGE UP (ref 3.8–5.2)
RBC # FLD: 13 % — SIGNIFICANT CHANGE UP (ref 10.3–14.5)
RBC CASTS # UR COMP ASSIST: ABNORMAL /HPF (ref 0–4)
SODIUM SERPL-SCNC: 140 MMOL/L — SIGNIFICANT CHANGE UP (ref 135–145)
SP GR SPEC: 1 — LOW (ref 1.01–1.02)
UROBILINOGEN FLD QL: NEGATIVE MG/DL — SIGNIFICANT CHANGE UP
WBC # BLD: 6.96 K/UL — SIGNIFICANT CHANGE UP (ref 3.8–10.5)
WBC # FLD AUTO: 6.96 K/UL — SIGNIFICANT CHANGE UP (ref 3.8–10.5)
WBC UR QL: SIGNIFICANT CHANGE UP

## 2021-12-13 PROCEDURE — 81001 URINALYSIS AUTO W/SCOPE: CPT

## 2021-12-13 PROCEDURE — 99285 EMERGENCY DEPT VISIT HI MDM: CPT

## 2021-12-13 PROCEDURE — 99284 EMERGENCY DEPT VISIT MOD MDM: CPT | Mod: 25

## 2021-12-13 PROCEDURE — 87086 URINE CULTURE/COLONY COUNT: CPT

## 2021-12-13 PROCEDURE — 74176 CT ABD & PELVIS W/O CONTRAST: CPT | Mod: MG

## 2021-12-13 PROCEDURE — 74176 CT ABD & PELVIS W/O CONTRAST: CPT | Mod: 26,MG

## 2021-12-13 PROCEDURE — G1004: CPT

## 2021-12-13 PROCEDURE — 80053 COMPREHEN METABOLIC PANEL: CPT

## 2021-12-13 PROCEDURE — 36415 COLL VENOUS BLD VENIPUNCTURE: CPT

## 2021-12-13 PROCEDURE — 85025 COMPLETE CBC W/AUTO DIFF WBC: CPT

## 2021-12-13 RX ORDER — LIDOCAINE 4 G/100G
1 CREAM TOPICAL ONCE
Refills: 0 | Status: COMPLETED | OUTPATIENT
Start: 2021-12-13 | End: 2021-12-13

## 2021-12-13 RX ORDER — KETOROLAC TROMETHAMINE 30 MG/ML
15 SYRINGE (ML) INJECTION ONCE
Refills: 0 | Status: DISCONTINUED | OUTPATIENT
Start: 2021-12-13 | End: 2021-12-13

## 2021-12-13 RX ORDER — MORPHINE SULFATE 50 MG/1
4 CAPSULE, EXTENDED RELEASE ORAL ONCE
Refills: 0 | Status: DISCONTINUED | OUTPATIENT
Start: 2021-12-13 | End: 2021-12-13

## 2021-12-13 RX ORDER — IBUPROFEN 200 MG
1 TABLET ORAL
Qty: 9 | Refills: 0
Start: 2021-12-13 | End: 2021-12-15

## 2021-12-13 RX ADMIN — LIDOCAINE 1 PATCH: 4 CREAM TOPICAL at 14:38

## 2021-12-13 RX ADMIN — Medication 15 MILLIGRAM(S): at 14:16

## 2021-12-13 RX ADMIN — MORPHINE SULFATE 4 MILLIGRAM(S): 50 CAPSULE, EXTENDED RELEASE ORAL at 16:00

## 2021-12-13 RX ADMIN — MORPHINE SULFATE 4 MILLIGRAM(S): 50 CAPSULE, EXTENDED RELEASE ORAL at 14:16

## 2021-12-13 RX ADMIN — Medication 15 MILLIGRAM(S): at 17:16

## 2021-12-13 RX ADMIN — Medication 15 MILLIGRAM(S): at 16:00

## 2021-12-13 NOTE — ED ADULT TRIAGE NOTE - CHIEF COMPLAINT QUOTE
lower back pain pt suspects she has stones reports sediment in her urine that resembles kidney stones.

## 2021-12-13 NOTE — ED STATDOCS - PATIENT PORTAL LINK FT
You can access the FollowMyHealth Patient Portal offered by [facility] by registering at the following website: [email or web address]. By joining [facility]’s FollowMyHealth portal, you will also be able to view your health information using other applications (apps) compatible with our system.

## 2021-12-13 NOTE — ED STATDOCS - NSICDXPASTSURGICALHX_GEN_ALL_CORE_FT
PAST SURGICAL HISTORY:  H/O repair of right rotator cuff 2002    Hiatal hernia with GERD S/P HIATAL HERNIA REPAIR 2019  " Endoscopic lesion of stomach"; 03/05/19    History of appendectomy 1991    History of bunionectomy right; 2006    Status post wrist surgery right;  2003

## 2021-12-13 NOTE — ED STATDOCS - NPI NUMBER (FOR SYSADMIN USE ONLY) :
[facility]  Short Stay  Discharge Summary    Admit Date: 2019    Discharge Date and Time:  2019 10:24 AM      Discharge Attending Physician: Alvin Kirkpatrick MD     Hospital Course: 5 month old girl with history of feeding problems. MBSS showed trace silent aspiration of all consistencies. She is now s/p direct laryngoscopy with bronchoscopy, which demonstrated a shallow groove vs. a type 1 laryngeal cleft and proceeded with repair. MRI negative for abnormalities. Hospital course was uneventful. She is tolerating her baseline diet of honey thickened liquids. She is breathing comfortably and pain is well managed.    Physical Exam:  Appearance: Sleeping comfortably.  Head/Face: Flattened occiput on right  Eyes: Pupils equal, round and reactive. Extraocular muscles intact. Conjunctiva clear.  Mouth: Mucosal membranes moist.  Neck: No anterior or posterior cervical lymphadenopathy.  Respiratory: Normal work of breathing. No stridor or stertor      Final Diagnoses:    Principal Problem: Laryngeal cleft   Secondary Diagnoses:   Active Hospital Problems    Diagnosis  POA    *Laryngeal cleft [Q31.8]  Not Applicable    Aspiration into airway [T17.908A]  Yes    Feeding problems [R63.3]  Yes    Gastroesophageal reflux disease [K21.9]  Yes      Resolved Hospital Problems    Diagnosis Date Resolved POA    Chiari malformation type I [G93.5] 2019 Yes       Discharged Condition: good    Disposition: Home or Self Care    Follow up/Patient Instructions:    Medications:  Reconciled Home Medications:      Medication List      CONTINUE taking these medications    esomeprazole magnesium 10 mg Grps  Commonly known as:  NEXIUM  Take 5 mg by mouth 2 (two) times daily.          Discharge Procedure Orders   Advance diet as tolerated     Activity order - Light Activity    Order Comments: For 2 weeks           
[[id number]]

## 2021-12-13 NOTE — ED STATDOCS - OBJECTIVE STATEMENT
60 y/o female with PMHx of kidney stones, c/o lower back pain. Patient reports back pain x3 days and noticed brown discharged appearing 3 nights ago. Patient noticed when she urinated today saw what she thinks were stones. Patient went to a urologies and was told to have kidney stones. Patient was treated for UTI 2 weeks ago. No allergies. 60 y/o female with PMHx of kidney stones, c/o right-sided lower back pain. Patient reports back pain x3 days and noticed brown discharge appearing 3 nights ago. Patient noticed when she urinated today saw what she thinks were stones in the toilet. Patient went to a urologist and was told she has history of kidney stones. Patient was treated for UTI 2 weeks ago. No allergies. No fevers/chills. Pain worse with movement.

## 2021-12-13 NOTE — ED STATDOCS - ATTENDING CONTRIBUTION TO CARE
I, Sapna Rose, performed a face to face bedside interview with this patient regarding history of present illness, review of symptoms and relevant past medical, social and family history.  I completed an independent physical examination. Medical decision making, follow-up on ordered tests (ie labs, radiologic studies) and re-evaluation of the patient's status has been communicated to the ACP.  Disposition of the patient will be based on test outcome and response to ED interventions.

## 2021-12-13 NOTE — ED STATDOCS - PHYSICAL EXAMINATION
Gen: NAD, AOx3  Head: NCAT  HEENT: PERRL, oral mucosa moist, normal conjunctiva, oropharynx clear without exudate or erythema  Lung: CTAB, no respiratory distress, no wheezing, rales, rhonchi  CV: normal s1/s2, rrr, no murmurs, Normal perfusion, pulses 2+ throughout  Abd: soft, +ttp @ right mid abdomen, right CVA tenderness  MSK: No edema, no visible deformities, full range of motion in all 4 extremities  Neuro: CN II-XII grossly intact, No focal neurologic deficits  Skin: No rash   Psych: normal affect Gen: NAD, AOx3  Head: NCAT  HEENT: oral mucosa moist, normal conjunctiva, oropharynx clear without exudate or erythema  Lung: CTAB, no respiratory distress, no wheezing, rales, rhonchi  CV: normal s1/s2, rrr, no murmurs, Normal perfusion, pulses 2+ throughout  Abd: soft, +ttp @ right mid abdomen, right CVA tenderness  MSK: No edema, no visible deformities, full range of motion in all 4 extremities  Neuro:  No focal neurologic deficits  Skin: No rash   Psych: normal affect

## 2021-12-13 NOTE — ED STATDOCS - NSFOLLOWUPINSTRUCTIONS_ED_ALL_ED_FT
Follow up with Dr. Khan for assessment of renal stones    Return to ED for any new or worsening symptoms

## 2021-12-13 NOTE — ED STATDOCS - CLINICAL SUMMARY MEDICAL DECISION MAKING FREE TEXT BOX
Right flank pain associated with dysuria. Will check labs, CT scan to eval for kidney stones and reassess.

## 2021-12-13 NOTE — ED STATDOCS - PROGRESS NOTE DETAILS
PA NOTE: Results of CT scan reviewed " Mild nonspecific asymmetric fullness of the right renal collecting system without overt hydronephrosis. Nonobstructing 1 to 2 mm calculi within the  right kidney. No obstructing urolithiasis identified.". Pt reports improvement in symptoms at this time. Advised to follow up with Dr. Khan upon discharge and return to ED for any new or worsening symptoms.

## 2021-12-14 LAB
CULTURE RESULTS: SIGNIFICANT CHANGE UP
SPECIMEN SOURCE: SIGNIFICANT CHANGE UP

## 2021-12-22 ENCOUNTER — APPOINTMENT (OUTPATIENT)
Dept: UROLOGY | Facility: CLINIC | Age: 61
End: 2021-12-22

## 2022-01-31 NOTE — ASU PATIENT PROFILE, ADULT - TEACHING/LEARNING FACTORS IMPACT ABILITY TO LEARN
From: Jenny Wiley  To: Rudy Corona  Sent: 1/31/2022 3:56 PM CST  Subject: extremely stuffy, coughing, and no sleep for four days    Hi Dr. Corona,  I’m just reaching to see if there’s anything I can do or take to prevent my current situation. I haven’t been able to sleep for four days because of my stuffy nose and post nasal drip. I’m coughing all night when I lay down because of it and it’s preventing me from going to classes. My surgery is coming up on February 10th but I was just wondering if there’s something I can do to get me through the next 10 days.    Best,  Jenny Wiley  
none

## 2022-03-02 ENCOUNTER — APPOINTMENT (OUTPATIENT)
Dept: UROLOGY | Facility: CLINIC | Age: 62
End: 2022-03-02
Payer: COMMERCIAL

## 2022-03-02 VITALS — SYSTOLIC BLOOD PRESSURE: 122 MMHG | OXYGEN SATURATION: 97 % | HEART RATE: 50 BPM | DIASTOLIC BLOOD PRESSURE: 64 MMHG

## 2022-03-02 PROCEDURE — 99213 OFFICE O/P EST LOW 20 MIN: CPT

## 2022-03-03 LAB
APPEARANCE: CLEAR
BACTERIA: NEGATIVE
BILIRUBIN URINE: NEGATIVE
BLOOD URINE: NEGATIVE
COLOR: NORMAL
GLUCOSE QUALITATIVE U: NEGATIVE
HYALINE CASTS: 0 /LPF
KETONES URINE: NEGATIVE
LEUKOCYTE ESTERASE URINE: NEGATIVE
MICROSCOPIC-UA: NORMAL
NITRITE URINE: NEGATIVE
PH URINE: 7
PROTEIN URINE: NEGATIVE
RED BLOOD CELLS URINE: 0 /HPF
SPECIFIC GRAVITY URINE: 1
SQUAMOUS EPITHELIAL CELLS: 0 /HPF
URINE CYTOLOGY: NORMAL
UROBILINOGEN URINE: NORMAL
WHITE BLOOD CELLS URINE: 0 /HPF

## 2022-03-08 ENCOUNTER — TRANSCRIPTION ENCOUNTER (OUTPATIENT)
Age: 62
End: 2022-03-08

## 2022-03-08 LAB
BACTERIA UR CULT: NORMAL
NIDUS STONE QN: NORMAL

## 2022-03-09 NOTE — LETTER BODY
[Dear  ___] : Dear  [unfilled], [Courtesy Letter:] : I had the pleasure of seeing your patient, [unfilled], in my office today. [Please see my note below.] : Please see my note below. [Sincerely,] : Sincerely, [FreeTextEntry3] : Juan Gooden MD\par  of Urology\par [facility] [employer]\par \par Offices:\par [facility] @\par [address], [place] [address]\par and\par [address], [place] [address], [address]\par and\par [address] [address]\par \par TEL: [phone]\par FAX: [phone]

## 2022-03-09 NOTE — HISTORY OF PRESENT ILLNESS
[FreeTextEntry1] : 63 yo female presents for follow up. \par Still has Microhematuria. \par In December went to [facility] with right flank pain. Subsequently passed a kidney stone. \par Collected, brought it with her. Since then fine. \par \par Initially seen for Renal cyst. \par Recently had passing out episode and during work up was found to have renal cyst and kidney stone. \par Daytime frequency is every 2 hours or so. Nocturia of 2-4 x. \par Endorsed urgency and incontinence with cough and sneeze.\par Denied dysuria, fever, chills or rigors.\par Had off and on right flank pain, 2-3/10. \par Had been told in the past has Microhematuria. No Urologic work up. \par Past smoker. 2 PPD for 35 years. Quit 9 years ago. \par Has history of recurrent urinary tract infections 3-4 years ago, saw gross hematuria. \par Has been taking Cranberry, no new urinary tract infection in a long time. \par

## 2022-03-09 NOTE — ASSESSMENT
[FreeTextEntry1] : Microhematuria:\par Kidney stone:\par Discussed work up so far recommended Cystoscopy. \par Will get Urinalysis, Urine culture and Urine cytology. \par Will get Stone analysis. \par Will get Renal and Bladder Ultrasound \par \par Return to clinic for next available Cystoscopy.

## 2022-03-16 ENCOUNTER — APPOINTMENT (OUTPATIENT)
Dept: UROLOGY | Facility: CLINIC | Age: 62
End: 2022-03-16
Payer: COMMERCIAL

## 2022-03-16 VITALS
SYSTOLIC BLOOD PRESSURE: 125 MMHG | DIASTOLIC BLOOD PRESSURE: 60 MMHG | WEIGHT: 144 LBS | HEART RATE: 46 BPM | BODY MASS INDEX: 22.6 KG/M2 | OXYGEN SATURATION: 99 % | HEIGHT: 67 IN

## 2022-03-16 DIAGNOSIS — N39.41 URGE INCONTINENCE: ICD-10-CM

## 2022-03-16 DIAGNOSIS — N39.0 URINARY TRACT INFECTION, SITE NOT SPECIFIED: ICD-10-CM

## 2022-03-16 PROCEDURE — 52000 CYSTOURETHROSCOPY: CPT

## 2022-03-16 PROCEDURE — 99214 OFFICE O/P EST MOD 30 MIN: CPT | Mod: 25

## 2022-03-20 NOTE — PHYSICAL EXAM
[Normal Appearance] : normal appearance [General Appearance - In No Acute Distress] : no acute distress [External Female Genitalia] : normal external genitalia [Atrophy] : atrophy [] : no respiratory distress [Oriented To Time, Place, And Person] : oriented to person, place, and time [FreeTextEntry1] : normal peripheral circulation

## 2022-03-20 NOTE — ASSESSMENT
[FreeTextEntry1] : Reviewed records. \par CT scan(12/2021): 1-2 mm non obstructing kidney stone, no hydronephrosis. \par \par Kidney stone:\par Passed kidney stone ?\par Stone analysis: not stone\par Recommended Kidney stone prevention diet:\par Good oral hydration so that urine is clear to light yellow, usually 1.5 to 2 Liters of fluids, mainly water\par Increasing Citrate in diet by consuming citrus fruits and juices- dinorah, limes, oranges, grapefruits and berries \par Less red meat\par Less salt\par Limit foods with oxalate like- dark green vegetables, rhubarb, chocolate, wheat bran, nuts, cranberries, and beans\par Will get Renal Bladder Ultrasound and KUB. \par \par Urge incontinence:\par Will review Bladder Ultrasound. \par Will consider anti cholinergic or Beta 3 agonist. \par \par Microhematuria:\par Discussed work up of hematuria so far. Recommended Cystoscopy. Discussed risks and benefits. \par Negative Cystoscopy today except Urethral caruncle. \par \par Recurrent urinary tract infections:\par Recommended: good oral hydration, timed voiding, urinate right after sex, change sanitary pads often, avoid douches, bubble baths and other feminine hygiene products. \par Recommended post coital prophylaxis with Macrobid 100 mg 30 mins after sexual activity. \par Will consider Estrogen if continues to have recurrent urinary tract infections. \par \par Return to office in 3 months or sooner if any issues.

## 2022-03-20 NOTE — HISTORY OF PRESENT ILLNESS
[FreeTextEntry1] : 63 yo female presents for follow up. \par Still has urgency, urge incontinence and incontinence with cough and sneeze. \par Keeps getting urinary tract infections after sexual activity. \par \par In December went to [facility] with right flank pain. Subsequently passed a kidney stone. \par Collected, had brought it with her. Since then fine. \par \par Initially seen for Renal cyst. \par Recently had passing out episode and during work up was found to have renal cyst and kidney stone. \par Daytime frequency is every 2 hours or so. Nocturia of 2-4 x. \par Endorsed urgency and incontinence with cough and sneeze.\par Denied dysuria, fever, chills or rigors.\par Had off and on right flank pain, 2-3/10. \par Had been told in the past has Microhematuria. No Urologic work up. \par Past smoker. 2 PPD for 35 years. Quit 9 years ago. \par Has history of recurrent urinary tract infections 3-4 years ago, saw gross hematuria. \par Has been taking Cranberry, no new urinary tract infection in a long time. \par

## 2022-03-28 ENCOUNTER — APPOINTMENT (OUTPATIENT)
Dept: RADIOLOGY | Facility: CLINIC | Age: 62
End: 2022-03-28
Payer: COMMERCIAL

## 2022-03-28 ENCOUNTER — APPOINTMENT (OUTPATIENT)
Dept: ULTRASOUND IMAGING | Facility: CLINIC | Age: 62
End: 2022-03-28
Payer: COMMERCIAL

## 2022-03-28 ENCOUNTER — APPOINTMENT (OUTPATIENT)
Dept: CT IMAGING | Facility: CLINIC | Age: 62
End: 2022-03-28
Payer: COMMERCIAL

## 2022-03-28 ENCOUNTER — OUTPATIENT (OUTPATIENT)
Dept: OUTPATIENT SERVICES | Facility: HOSPITAL | Age: 62
LOS: 1 days | End: 2022-03-28
Payer: COMMERCIAL

## 2022-03-28 DIAGNOSIS — R31.29 OTHER MICROSCOPIC HEMATURIA: ICD-10-CM

## 2022-03-28 DIAGNOSIS — K21.9 GASTRO-ESOPHAGEAL REFLUX DISEASE WITHOUT ESOPHAGITIS: Chronic | ICD-10-CM

## 2022-03-28 DIAGNOSIS — Z98.890 OTHER SPECIFIED POSTPROCEDURAL STATES: Chronic | ICD-10-CM

## 2022-03-28 DIAGNOSIS — N20.0 CALCULUS OF KIDNEY: ICD-10-CM

## 2022-03-28 DIAGNOSIS — R91.8 OTHER NONSPECIFIC ABNORMAL FINDING OF LUNG FIELD: ICD-10-CM

## 2022-03-28 DIAGNOSIS — Z00.8 ENCOUNTER FOR OTHER GENERAL EXAMINATION: ICD-10-CM

## 2022-03-28 DIAGNOSIS — Z90.49 ACQUIRED ABSENCE OF OTHER SPECIFIED PARTS OF DIGESTIVE TRACT: Chronic | ICD-10-CM

## 2022-03-28 PROCEDURE — 74018 RADEX ABDOMEN 1 VIEW: CPT | Mod: 26

## 2022-03-28 PROCEDURE — 76770 US EXAM ABDO BACK WALL COMP: CPT | Mod: 26

## 2022-03-28 PROCEDURE — 76770 US EXAM ABDO BACK WALL COMP: CPT

## 2022-03-28 PROCEDURE — 74018 RADEX ABDOMEN 1 VIEW: CPT

## 2022-03-29 ENCOUNTER — TRANSCRIPTION ENCOUNTER (OUTPATIENT)
Age: 62
End: 2022-03-29

## 2022-04-05 ENCOUNTER — TRANSCRIPTION ENCOUNTER (OUTPATIENT)
Age: 62
End: 2022-04-05

## 2022-04-14 ENCOUNTER — APPOINTMENT (OUTPATIENT)
Dept: PULMONOLOGY | Facility: CLINIC | Age: 62
End: 2022-04-14
Payer: COMMERCIAL

## 2022-04-14 VITALS
HEART RATE: 47 BPM | SYSTOLIC BLOOD PRESSURE: 120 MMHG | WEIGHT: 144 LBS | RESPIRATION RATE: 16 BRPM | DIASTOLIC BLOOD PRESSURE: 70 MMHG | OXYGEN SATURATION: 97 % | BODY MASS INDEX: 22.55 KG/M2

## 2022-04-14 DIAGNOSIS — Z87.891 PERSONAL HISTORY OF NICOTINE DEPENDENCE: ICD-10-CM

## 2022-04-14 DIAGNOSIS — G47.33 OBSTRUCTIVE SLEEP APNEA (ADULT) (PEDIATRIC): ICD-10-CM

## 2022-04-14 DIAGNOSIS — R91.8 OTHER NONSPECIFIC ABNORMAL FINDING OF LUNG FIELD: ICD-10-CM

## 2022-04-14 PROCEDURE — 99214 OFFICE O/P EST MOD 30 MIN: CPT | Mod: 25

## 2022-04-14 PROCEDURE — G0296 VISIT TO DETERM LDCT ELIG: CPT

## 2022-04-14 RX ORDER — NITROFURANTOIN (MONOHYDRATE/MACROCRYSTALS) 25; 75 MG/1; MG/1
100 CAPSULE ORAL
Qty: 15 | Refills: 5 | Status: COMPLETED | COMMUNITY
Start: 2022-03-16 | End: 2022-04-14

## 2022-04-14 NOTE — HISTORY OF PRESENT ILLNESS
[Former] : former [Never] : never [On ___] : performed on [unfilled] [Patient] : the patient [Indication ___] : for an indication of [unfilled] [Excessive Daytime Sleepiness] : excessive daytime sleepiness [Unrefreshing Sleep] : unrefreshing sleep [Sleepy When Sedentary] : sleepy when sedentary [Currently Experiencing] : The patient is currently experiencing symptoms. [None] : The patient is not currently being treated for this problem [TextBox_4] : Pt reports a syncopal episode of unclear etiology though she reportedly was hypotensive at that time. She has chronic bradycardia and relative hypotension. \par She denies significant respiratory complaints. \par She is a former 70 pk yr smoker but quit in 2011.\par  [TextBox_11] : 2 [TextBox_13] : 35 [YearQuit] : 2011 [FreeTextEntry9] : Chest CT  [FreeTextEntry8] : Small PASCALE 3 mm nodule and RLL 2 mm calcified nodule otherwise clear [Follow-Up - Routine Clinic] : a routine clinic follow-up of

## 2022-04-14 NOTE — REASON FOR VISIT
[Follow-Up] : a follow-up visit [Abnormal CXR/ Chest CT] : an abnormal CXR/ chest CT [Sleep Apnea] : sleep apnea [Pulmonary Nodules] : pulmonary nodules [TextBox_44] : Smoking hx

## 2022-04-14 NOTE — CONSULT LETTER
[Dear  ___] : Dear  [unfilled], [Consult Letter:] : I had the pleasure of evaluating your patient, [unfilled]. [Please see my note below.] : Please see my note below. [Consult Closing:] : Thank you very much for allowing me to participate in the care of this patient.  If you have any questions, please do not hesitate to contact me. [Sincerely,] : Sincerely, [FreeTextEntry3] : Johan Juan MD, FCCP, D. ABSM\par Pulmonary and Sleep Medicine\par [facility] at [place]

## 2022-04-20 NOTE — ASU PATIENT PROFILE, ADULT - NS TRANSFER HEARING AID
CC:  Pantera M Rayshawn is here today for Physical (annual exam)       Medications: medications verified and updated    Refills needed today?  NO    Latex allergy or sensitivity: No known latex allergy     Patient would like communication of their results via:  [facility]    Patient's current [facility] status: Active.    Advanced directives: n/a    Care Teams Verified: No Changes    Depression Screen: yes negative    Tobacco history: verified    Health Maintenance Due   Topic Date Due   • Colorectal Cancer Screen-  11/07/2020   • COVID-19 Vaccine (3 - Booster for Moderna series) 07/24/2021       Patient is due for the topics as listed above and wishes to proceed with them. Orders placed for Colorectal Cancer Screening: Colonoscopy.   none

## 2022-06-15 ENCOUNTER — APPOINTMENT (OUTPATIENT)
Dept: UROLOGY | Facility: CLINIC | Age: 62
End: 2022-06-15
Payer: COMMERCIAL

## 2022-06-15 VITALS
BODY MASS INDEX: 23.14 KG/M2 | WEIGHT: 144 LBS | HEIGHT: 66 IN | DIASTOLIC BLOOD PRESSURE: 64 MMHG | HEART RATE: 43 BPM | SYSTOLIC BLOOD PRESSURE: 149 MMHG | OXYGEN SATURATION: 99 %

## 2022-06-15 DIAGNOSIS — N20.0 CALCULUS OF KIDNEY: ICD-10-CM

## 2022-06-15 DIAGNOSIS — R31.29 OTHER MICROSCOPIC HEMATURIA: ICD-10-CM

## 2022-06-15 PROCEDURE — 99214 OFFICE O/P EST MOD 30 MIN: CPT

## 2022-06-15 NOTE — ASSESSMENT
[FreeTextEntry1] : Reviewed records. \par \par Stone analysis: not kidney stone \par Renal and Bladder Ultrasound(3/28/2022):\par No kidney or ureteral stone or hydronephrosis. \par Pre void: 1017 ml\par Post void: 5 ml. \par \par Kidney stone:\par Continue kidney stone prevention diet. \par \par Microhematuria \par Status post negative work up including Cystoscopy in March 2022. \par Recommended annual Urinalysis and if persistent repeat work up in 3-5 years or sooner if clinically indicated. \par \par Return to office as needed. \par

## 2022-06-15 NOTE — HISTORY OF PRESENT ILLNESS
[FreeTextEntry1] : 61 yo female presents for follow up. \par Since last visit doing well. \par Same urination. \par \par Cystoscopy(3/16/2022): negative. \par \par In December went to [facility] with right flank pain. Subsequently passed a kidney stone. \par Collected, brought it with her. \par CT scan(12/2021):  2 mm calcification within the upper pole of the right kidney, which may represent a nonobstructing calculus or vascular calcification. There is also a punctate nonobstructing 1 to 2 mm calculus within the lower pole of the right kidney.\par \par Stone analysis: not kidney stone. \par \par Initially seen for Renal cyst. \par Recently had passing out episode and during work up was found to have renal cyst and kidney stone. \par Daytime frequency is every 2 hours or so. Nocturia of 2-4 x. \par Endorsed urgency and incontinence with cough and sneeze.\par Denied dysuria, fever, chills or rigors.\par Had off and on right flank pain, 2-3/10. \par Had been told in the past has Microhematuria. No Urologic work up. \par Past smoker. 2 PPD for 35 years. Quit 9 years ago. \par Has history of recurrent urinary tract infections 3-4 years ago, saw gross hematuria. \par Has been taking Cranberry, no new urinary tract infection in a long time. \par

## 2022-06-15 NOTE — LETTER BODY
[Dear  ___] : Dear  [unfilled], [Courtesy Letter:] : I had the pleasure of seeing your patient, [unfilled], in my office today. [Please see my note below.] : Please see my note below. [Sincerely,] : Sincerely, [FreeTextEntry3] : Juan Gooden MD\par  of Urology\par [facility] [employer] [facility]\par \par Offices:\par [facility] @\par [address], [place] [address]\par and\par [address], [place] [address], [address]\par and\par [address] [address]\par \par TEL: [phone]\par FAX: [phone]

## 2022-08-02 NOTE — ED PROVIDER NOTE - NSPTACCESSSVCSAPPT_ED_ALL_ED
Copied over from last encounter 8/1/2022.     Patient spoke with nurse yesterday regarding Poison Ivy.  She has since started hives yesterday - unsure of source - Arm, underwear line and bra line.  She took Benadryl 25mg just now but would like to speak with nurse for additional information.  [phone]      Specialty Care/PCP for Immediate Care

## 2022-08-24 ENCOUNTER — NON-APPOINTMENT (OUTPATIENT)
Age: 62
End: 2022-08-24

## 2022-08-25 ENCOUNTER — OUTPATIENT (OUTPATIENT)
Dept: OUTPATIENT SERVICES | Facility: HOSPITAL | Age: 62
LOS: 1 days | End: 2022-08-25
Payer: COMMERCIAL

## 2022-08-25 VITALS
TEMPERATURE: 98 F | WEIGHT: 153.66 LBS | SYSTOLIC BLOOD PRESSURE: 100 MMHG | HEIGHT: 67 IN | OXYGEN SATURATION: 98 % | DIASTOLIC BLOOD PRESSURE: 70 MMHG | HEART RATE: 48 BPM | RESPIRATION RATE: 20 BRPM

## 2022-08-25 DIAGNOSIS — I10 ESSENTIAL (PRIMARY) HYPERTENSION: ICD-10-CM

## 2022-08-25 DIAGNOSIS — Z90.49 ACQUIRED ABSENCE OF OTHER SPECIFIED PARTS OF DIGESTIVE TRACT: Chronic | ICD-10-CM

## 2022-08-25 DIAGNOSIS — I48.0 PAROXYSMAL ATRIAL FIBRILLATION: ICD-10-CM

## 2022-08-25 DIAGNOSIS — Z98.890 OTHER SPECIFIED POSTPROCEDURAL STATES: Chronic | ICD-10-CM

## 2022-08-25 DIAGNOSIS — Z01.818 ENCOUNTER FOR OTHER PREPROCEDURAL EXAMINATION: ICD-10-CM

## 2022-08-25 DIAGNOSIS — K21.9 GASTRO-ESOPHAGEAL REFLUX DISEASE WITHOUT ESOPHAGITIS: Chronic | ICD-10-CM

## 2022-08-25 DIAGNOSIS — Z29.9 ENCOUNTER FOR PROPHYLACTIC MEASURES, UNSPECIFIED: ICD-10-CM

## 2022-08-25 LAB
A1C WITH ESTIMATED AVERAGE GLUCOSE RESULT: 5.1 % — SIGNIFICANT CHANGE UP (ref 4–5.6)
ANION GAP SERPL CALC-SCNC: 10 MMOL/L — SIGNIFICANT CHANGE UP (ref 5–17)
APTT BLD: 36.5 SEC — HIGH (ref 27.5–35.5)
BASOPHILS # BLD AUTO: 0.05 K/UL — SIGNIFICANT CHANGE UP (ref 0–0.2)
BASOPHILS NFR BLD AUTO: 0.9 % — SIGNIFICANT CHANGE UP (ref 0–2)
BLD GP AB SCN SERPL QL: SIGNIFICANT CHANGE UP
BUN SERPL-MCNC: 11.9 MG/DL — SIGNIFICANT CHANGE UP (ref 8–20)
CALCIUM SERPL-MCNC: 9.6 MG/DL — SIGNIFICANT CHANGE UP (ref 8.4–10.5)
CHLORIDE SERPL-SCNC: 104 MMOL/L — SIGNIFICANT CHANGE UP (ref 98–107)
CO2 SERPL-SCNC: 27 MMOL/L — SIGNIFICANT CHANGE UP (ref 22–29)
CREAT SERPL-MCNC: 0.59 MG/DL — SIGNIFICANT CHANGE UP (ref 0.5–1.3)
EGFR: 102 ML/MIN/1.73M2 — SIGNIFICANT CHANGE UP
EOSINOPHIL # BLD AUTO: 0.07 K/UL — SIGNIFICANT CHANGE UP (ref 0–0.5)
EOSINOPHIL NFR BLD AUTO: 1.3 % — SIGNIFICANT CHANGE UP (ref 0–6)
ESTIMATED AVERAGE GLUCOSE: 100 MG/DL — SIGNIFICANT CHANGE UP (ref 68–114)
GLUCOSE SERPL-MCNC: 86 MG/DL — SIGNIFICANT CHANGE UP (ref 70–99)
HCT VFR BLD CALC: 42.6 % — SIGNIFICANT CHANGE UP (ref 34.5–45)
HGB BLD-MCNC: 14.2 G/DL — SIGNIFICANT CHANGE UP (ref 11.5–15.5)
IMM GRANULOCYTES NFR BLD AUTO: 0.2 % — SIGNIFICANT CHANGE UP (ref 0–1.5)
INR BLD: 1.09 RATIO — SIGNIFICANT CHANGE UP (ref 0.88–1.16)
LYMPHOCYTES # BLD AUTO: 1.99 K/UL — SIGNIFICANT CHANGE UP (ref 1–3.3)
LYMPHOCYTES # BLD AUTO: 36.6 % — SIGNIFICANT CHANGE UP (ref 13–44)
MAGNESIUM SERPL-MCNC: 2 MG/DL — SIGNIFICANT CHANGE UP (ref 1.6–2.6)
MCHC RBC-ENTMCNC: 31.9 PG — SIGNIFICANT CHANGE UP (ref 27–34)
MCHC RBC-ENTMCNC: 33.3 GM/DL — SIGNIFICANT CHANGE UP (ref 32–36)
MCV RBC AUTO: 95.7 FL — SIGNIFICANT CHANGE UP (ref 80–100)
MONOCYTES # BLD AUTO: 0.46 K/UL — SIGNIFICANT CHANGE UP (ref 0–0.9)
MONOCYTES NFR BLD AUTO: 8.5 % — SIGNIFICANT CHANGE UP (ref 2–14)
NEUTROPHILS # BLD AUTO: 2.85 K/UL — SIGNIFICANT CHANGE UP (ref 1.8–7.4)
NEUTROPHILS NFR BLD AUTO: 52.5 % — SIGNIFICANT CHANGE UP (ref 43–77)
PLATELET # BLD AUTO: 241 K/UL — SIGNIFICANT CHANGE UP (ref 150–400)
POTASSIUM SERPL-MCNC: 5 MMOL/L — SIGNIFICANT CHANGE UP (ref 3.5–5.3)
POTASSIUM SERPL-SCNC: 5 MMOL/L — SIGNIFICANT CHANGE UP (ref 3.5–5.3)
PROTHROM AB SERPL-ACNC: 12.7 SEC — SIGNIFICANT CHANGE UP (ref 10.5–13.4)
RBC # BLD: 4.45 M/UL — SIGNIFICANT CHANGE UP (ref 3.8–5.2)
RBC # FLD: 13.2 % — SIGNIFICANT CHANGE UP (ref 10.3–14.5)
SODIUM SERPL-SCNC: 141 MMOL/L — SIGNIFICANT CHANGE UP (ref 135–145)
T3 SERPL-MCNC: 86 NG/DL — SIGNIFICANT CHANGE UP (ref 80–200)
T4 AB SER-ACNC: 7.7 UG/DL — SIGNIFICANT CHANGE UP (ref 4.5–12)
TSH SERPL-MCNC: 2.18 UIU/ML — SIGNIFICANT CHANGE UP (ref 0.27–4.2)
WBC # BLD: 5.43 K/UL — SIGNIFICANT CHANGE UP (ref 3.8–10.5)
WBC # FLD AUTO: 5.43 K/UL — SIGNIFICANT CHANGE UP (ref 3.8–10.5)

## 2022-08-25 PROCEDURE — 93005 ELECTROCARDIOGRAM TRACING: CPT

## 2022-08-25 PROCEDURE — 93010 ELECTROCARDIOGRAM REPORT: CPT

## 2022-08-25 RX ORDER — ALENDRONATE SODIUM 70 MG/1
1 TABLET ORAL
Qty: 0 | Refills: 0 | DISCHARGE

## 2022-08-25 NOTE — H&P PST ADULT - HISTORY OF PRESENT ILLNESS
62y Female with history of _____________, and symptomatic **paroxysmal or persistent** atrial fibrillation. **add additional description as applicable**    Patient now presents in anticipation of elective radiofrequency ablation of atrial fibrillation.       Echocardiogram (date):   Stress Test (date):  Cardiac CT or MRI (date):   Cardiac Cath (date):   Cardiac surgery (date):  62y Female with history of  HTN, HLD, hypothyroidism and bradycardis and symptomatic paroxysmal atrial fibrillation. Pt reports she feels palpitations when she is experiencing Afib, pt tends to lie down and feels better.  Pt denies pain, or passing out.  Pt reports last episode of Afib in July.    Patient now presents in anticipation of elective radiofrequency ablation of atrial fibrillation.       Echocardiogram (date): 4/28/2021  Stress Test (date): 2021   Cardiac CT or MRI (date): denies  Cardiac Cath (date): denies  Cardiac surgery (date): 8/30/2022 having afib ablation 62y Female with history of  HTN, HLD, hypothyroidism and bradycardia and symptomatic paroxysmal atrial fibrillation. Pt reports she feels palpitations when she is experiencing Afib, pt tends to lie down and feels better.  Pt denies pain, or passing out, syncope, chest pain or SOB..  Pt reports last episode of Afib in July.  Pt reports she want to have an afib ablation to help manage her afib.    Patient now presents in anticipation of elective radiofrequency ablation of atrial fibrillation.       Echocardiogram (date): 4/28/2021  Stress Test (date): 2021   Cardiac CT or MRI (date): denies  Cardiac Cath (date): denies  Cardiac surgery (date): 8/30/2022 having afib ablation

## 2022-08-25 NOTE — H&P PST ADULT - ASSESSMENT
Plan:   Labs pending.   Pre-procedure instructions provided (verbal & written) as follows:  Ablation */*/2021   - Last dose Eliquis/Pradaxa/Xarelto/Savaysa */*/2021 PM (NO a/c day of ablation).   OR - Continue warfarin without interruption.   - NPO after midnight prior except meds w/ sips of water.    - Hold the following medications the morning of the procedure: ***Check MD note for possible instructions re: holding antiarrhythmic medications*** 62y Female with history of  HTN, HLD, hypothyroidism and bradycardis and symptomatic paroxysmal atrial fibrillation. Pt reports she feels palpitations when she is experiencing Afib, pt tends to lie down and feels better.  Pt denies pain, or passing out.  Pt reports last episode of Afib in July. Pt scheduled for a Afib ablation carto anes with Dr Dowell 8/30/2022.    Patient now presents in anticipation of elective radiofrequency ablation of atrial fibrillation.       Echocardiogram (date): 4/28/2021  Stress Test (date): 2021   Cardiac CT or MRI (date): denies  Cardiac Cath (date): denies  Cardiac surgery (date): 8/30/2022 having afib ablation    Plan:   Labs pending.   Pre-procedure instructions provided (verbal & written) as follows:  Ablation 8/30/2022   - Last dose Eliquis 8/29/2021 PM (NO a/c day of ablation).    - NPO after midnight prior except meds w/ sips of water.    - Hold the following medications the morning of the procedure: synthroid, simvastatin, eliquis 62y Female with history of  HTN, HLD, hypothyroidism and bradycardis and symptomatic paroxysmal atrial fibrillation. Pt reports she feels palpitations when she is experiencing Afib, pt tends to lie down and feels better.  Pt denies pain, or passing out, chest pain, SOB, syncope.  Pt reports last episode of Afib in July. Pt scheduled for a Afib ablation carto anes with Dr Dowell 2022.    Patient now presents in anticipation of elective radiofrequency ablation of atrial fibrillation.       Echocardiogram (date): 2021  Stress Test (date):    Cardiac CT or MRI (date): denies  Cardiac Cath (date): denies  Cardiac surgery (date): 2022 having afib ablation    Plan:   Labs pending.   Pre-procedure instructions provided (verbal & written) as follows:  Ablation 2022.    Patient was educated on preoperative preperation with written and verbal instruction .. Patient was educated on aspirin and aspirin products NSAIDS ,vitamins and herbals that increase the risk of bleeding and need to be stopped five days before procedure  . Patient was also educated on covid testing and covid prevention ,social distancing and wearing a mask.     Pt instructed not to take her ELIQUIS the morning of her procedure.  Last dose 2022 in evening.  Pt will make COVID screening test appointment.   COVID vaccinated with 2 boosters  see card in chart  OPIOID RISK TOOL    ODALYS EACH BOX THAT APPLIES AND ADD TOTALS AT THE END    FAMILY HISTORY OF SUBSTANCE ABUSE                 FEMALE         MALE                                                Alcohol                             [  ]1 pt          [  ]3pts                                               Illegal Durgs                     [  ]2 pts        [  ]3pts                                               Rx Drugs                           [  ]4 pts        [  ]4 pts    PERSONAL HISTORY OF SUBSTANCE ABUSE                                                                                          Alcohol                             [  ]3 pts       [  ]3 pts                                               Illegal Drugs                     [  ]4 pts        [  ]4 pts                                               Rx Drugs                           [  ]5 pts        [  ]5 pts    AGE BETWEEN 16-45 YEARS                                      [  ]1 pt         [  ]1 pt    HISTORY OF PREADOLESCENT   SEXUAL ABUSE                                                             [  ]3 pts        [  ]0pts    PSYCHOLOGICAL DISEASE                     ADD, OCD, Bipolar, Schizophrenia        [  ]2 pts         [  ]2 pts                      Depression                                               [  ]1 pt           [  ]1 pt           SCORING TOTAL   (add numbers and type here)        0                                   CAPRINI SCORE [CLOT]    AGE RELATED RISK FACTORS                                                       MOBILITY RELATED FACTORS  [ ] Age 41-60 years                                            (1 Point)                  [ ] Bed rest                                                        (1 Point)  [x ] Age: 61-74 years                                           (2 Points)                 [ ] Plaster cast                                                   (2 Points)  [ ] Age= 75 years                                              (3 Points)                 [ ] Bed bound for more than 72 hours                 (2 Points)    DISEASE RELATED RISK FACTORS                                               GENDER SPECIFIC FACTORS  [ ] Edema in the lower extremities                       (1 Point)                  [ ] Pregnancy                                                     (1 Point)  [ ] Varicose veins                                               (1 Point)                  [ ] Post-partum < 6 weeks                                   (1 Point)             [ ] BMI > 25 Kg/m2                                            (1 Point)                  [ ] Hormonal therapy  or oral contraception          (1 Point)                 [ ] Sepsis (in the previous month)                        (1 Point)                  [ ] History of pregnancy complications                 (1 point)  [ ] Pneumonia or serious lung disease                                               [ ] Unexplained or recurrent                     (1 Point)           (in the previous month)                               (1 Point)  [ ] Abnormal pulmonary function test                     (1 Point)                 SURGERY RELATED RISK FACTORS  [ ] Acute myocardial infarction                              (1 Point)                 [ ]  Section                                             (1 Point)  [ ] Congestive heart failure (in the previous month)  (1 Point)               [ ] Minor surgery                                                  (1 Point)   [ ] Inflammatory bowel disease                             (1 Point)                 [ ] Arthroscopic surgery                                        (2 Points)  [ ] Central venous access                                      (2 Points)                [ x] General surgery lasting more than 45 minutes   (2 Points)       [ ] Stroke (in the previous month)                          (5 Points)               [ ] Elective arthroplasty                                         (5 Points)                                                                                                                                               HEMATOLOGY RELATED FACTORS                                                 TRAUMA RELATED RISK FACTORS  [ ] Prior episodes of VTE                                     (3 Points)                [ ] Fracture of the hip, pelvis, or leg                       (5 Points)  [ ] Positive family history for VTE                         (3 Points)                 [ ] Acute spinal cord injury (in the previous month)  (5 Points)  [ ] Prothrombin 89213 A                                     (3 Points)                 [ ] Paralysis  (less than 1 month)                             (5 Points)  [ ] Factor V Leiden                                             (3 Points)                  [ ] Multiple Trauma within 1 month                        (5 Points)  [ ] Lupus anticoagulants                                     (3 Points)                                                           [ ] Anticardiolipin antibodies                               (3 Points)                                                       [ ] High homocysteine in the blood                      (3 Points)                                             [ ] Other congenital or acquired thrombophilia      (3 Points)                                                [ ] Heparin induced thrombocytopenia                  (3 Points)                                          Total Score [       4   ]            - Last dose Eliquis 2021 PM (NO a/c day of ablation).    - NPO after midnight prior except meds w/ sips of water.    - Hold the following medications the morning of the procedure: synthroid, simvastatin, eliquis

## 2022-08-25 NOTE — H&P PST ADULT - NSICDXPASTMEDICALHX_GEN_ALL_CORE_FT
PAST MEDICAL HISTORY:  Dyslipidemia     GERD (gastroesophageal reflux disease) hernia repair 2019 GERD resolved    Hypertension     Hypothyroidism     Obesity     Osteopenia

## 2022-08-25 NOTE — H&P PST ADULT - HAVE YOU HAD A SECOND COVID-19 BOOSTER?
[address] [address]    Phone:  [phone]    Fax:  [phone]       Thank You for choosing us for your health care visit. We are glad to serve you and happy to provide you with this summary of your visit. Please help us to ensure we have accurate records. If you find anything that needs to be changed, please let our staff know as soon as possible.          Your Demographic Information     Patient Name Sex Kaiser Abad Male 2007       Ethnic Group Patient Race    Not of  or  Origin White      Your Visit Details     Date & Time Provider Department    2017 9:30 AM Gunner Slaughter MD [facility]      Your To Do List     Follow-Up    Return if symptoms worsen or fail to improve.      Conditions Discussed Today or Order-Related Diagnoses        Comments    Acute URI    -  Primary       Your Vitals Were     BP Pulse Temp Weight Smoking Status       110/78 (BP Location: RUE, Patient Position: Sitting, Cuff Size: Pediatric) 96 98.6 °F (37 °C) (Oral) 60 lb 15.3 oz (27.7 kg) (54 %, Z= 0.11)* Never Smoker     *Growth percentiles are based on CDC 2-20 Years data.      Medications Prescribed or Re-Ordered Today     amoxicillin (AMOXIL) 500 MG capsule    Sig - Route: Take 1 capsule by mouth 3 times daily for 10 days. - Oral    Class: Normal    Pharmacy: [id number] - [address] Ph #: [phone]      Your Current Medications Are        Disp Refills Start End    lisdexamfetamine (VYVANSE) 20 MG capsule 30 capsule 0 2017     Sig - Route: Take 1 capsule by mouth every morning. - Oral    Polyethylene Glycol 3350 (MIRALAX PO)        Sig - Route: Take  by mouth. - Oral    Class: Historical Med    amoxicillin (AMOXIL) 500 MG capsule 30 capsule 0 2017 3/6/2017    Sig - Route: Take 1 capsule by mouth 3 times daily for 10 days. - Oral      Allergies     No Known  Allergies      Immunizations History as of 2/24/2017     Name Date    DTaP 8/30/2012, 12/3/2009, 2/11/2009, 2008, 2008    HEPATITIS A CHILD 10/26/2010, 3/4/2010    HIB 9/2/2009, 2/11/2009, 2008, 2008    Hepatitis B Child 12/12/2009, 2/11/2009, 2008, 2008    Influenza 10/26/2010, 12/31/2009, 12/3/2009    MMR 8/30/2012, 12/3/2009    Pneumococcal Conjugate 13 Valent 10/26/2010, 9/2/2009, 2/11/2009, 2008, 2008    Polio, INACTIVATED 8/30/2012, 2/11/2009, 2008, 2008    Rotavirus - Pentavalent 2/11/2009, 2008, 2008    Varicella 8/30/2012, 9/2/2009              Patient Instructions    Start Amoxicillin 500 mg three times daily for 10 days if fever recurs or ear pain starts    May have claritin 10 mg daily for congestion    May have tylenol and advil as needed for comfort/fever        Yes

## 2022-08-30 ENCOUNTER — TRANSCRIPTION ENCOUNTER (OUTPATIENT)
Age: 62
End: 2022-08-30

## 2022-08-30 ENCOUNTER — OUTPATIENT (OUTPATIENT)
Dept: OUTPATIENT SERVICES | Facility: HOSPITAL | Age: 62
LOS: 1 days | End: 2022-08-30
Payer: COMMERCIAL

## 2022-08-30 VITALS
RESPIRATION RATE: 18 BRPM | OXYGEN SATURATION: 95 % | SYSTOLIC BLOOD PRESSURE: 138 MMHG | HEART RATE: 52 BPM | DIASTOLIC BLOOD PRESSURE: 64 MMHG

## 2022-08-30 VITALS
TEMPERATURE: 98 F | WEIGHT: 153 LBS | SYSTOLIC BLOOD PRESSURE: 134 MMHG | HEIGHT: 67 IN | DIASTOLIC BLOOD PRESSURE: 65 MMHG | RESPIRATION RATE: 16 BRPM | HEART RATE: 43 BPM | OXYGEN SATURATION: 98 %

## 2022-08-30 DIAGNOSIS — I48.0 PAROXYSMAL ATRIAL FIBRILLATION: ICD-10-CM

## 2022-08-30 DIAGNOSIS — Z98.890 OTHER SPECIFIED POSTPROCEDURAL STATES: Chronic | ICD-10-CM

## 2022-08-30 DIAGNOSIS — K21.9 GASTRO-ESOPHAGEAL REFLUX DISEASE WITHOUT ESOPHAGITIS: Chronic | ICD-10-CM

## 2022-08-30 DIAGNOSIS — Z90.49 ACQUIRED ABSENCE OF OTHER SPECIFIED PARTS OF DIGESTIVE TRACT: Chronic | ICD-10-CM

## 2022-08-30 LAB — ABO RH CONFIRMATION: SIGNIFICANT CHANGE UP

## 2022-08-30 PROCEDURE — 36415 COLL VENOUS BLD VENIPUNCTURE: CPT

## 2022-08-30 PROCEDURE — 93010 ELECTROCARDIOGRAM REPORT: CPT

## 2022-08-30 PROCEDURE — C1730: CPT

## 2022-08-30 PROCEDURE — C1760: CPT

## 2022-08-30 PROCEDURE — C1732: CPT

## 2022-08-30 PROCEDURE — 92960 CARDIOVERSION ELECTRIC EXT: CPT | Mod: 59

## 2022-08-30 PROCEDURE — 93656 COMPRE EP EVAL ABLTJ ATR FIB: CPT

## 2022-08-30 PROCEDURE — C1894: CPT

## 2022-08-30 PROCEDURE — C1889: CPT

## 2022-08-30 PROCEDURE — 93005 ELECTROCARDIOGRAM TRACING: CPT

## 2022-08-30 PROCEDURE — C1759: CPT

## 2022-08-30 RX ORDER — ONDANSETRON 8 MG/1
4 TABLET, FILM COATED ORAL EVERY 8 HOURS
Refills: 0 | Status: DISCONTINUED | OUTPATIENT
Start: 2022-08-30 | End: 2022-09-13

## 2022-08-30 RX ORDER — LOSARTAN POTASSIUM 100 MG/1
1 TABLET, FILM COATED ORAL
Qty: 0 | Refills: 0 | DISCHARGE

## 2022-08-30 RX ORDER — SODIUM CHLORIDE 9 MG/ML
500 INJECTION INTRAMUSCULAR; INTRAVENOUS; SUBCUTANEOUS ONCE
Refills: 0 | Status: DISCONTINUED | OUTPATIENT
Start: 2022-08-30 | End: 2022-09-13

## 2022-08-30 RX ORDER — ACETAMINOPHEN 500 MG
650 TABLET ORAL EVERY 6 HOURS
Refills: 0 | Status: DISCONTINUED | OUTPATIENT
Start: 2022-08-30 | End: 2022-09-13

## 2022-08-30 RX ORDER — APIXABAN 2.5 MG/1
5 TABLET, FILM COATED ORAL
Refills: 0 | Status: DISCONTINUED | OUTPATIENT
Start: 2022-08-30 | End: 2022-09-13

## 2022-08-30 RX ORDER — SUCRALFATE 1 G
1 TABLET ORAL
Refills: 0 | Status: DISCONTINUED | OUTPATIENT
Start: 2022-08-30 | End: 2022-09-13

## 2022-08-30 RX ORDER — PANTOPRAZOLE SODIUM 20 MG/1
1 TABLET, DELAYED RELEASE ORAL
Qty: 70 | Refills: 0
Start: 2022-08-30 | End: 2022-09-12

## 2022-08-30 RX ORDER — BENZOCAINE AND MENTHOL 5; 1 G/100ML; G/100ML
1 LIQUID ORAL
Refills: 0 | Status: DISCONTINUED | OUTPATIENT
Start: 2022-08-30 | End: 2022-08-30

## 2022-08-30 RX ORDER — ALPRAZOLAM 0.25 MG
0.25 TABLET ORAL EVERY 6 HOURS
Refills: 0 | Status: DISCONTINUED | OUTPATIENT
Start: 2022-08-30 | End: 2022-08-30

## 2022-08-30 RX ORDER — LEVOTHYROXINE SODIUM 125 MCG
1 TABLET ORAL
Qty: 0 | Refills: 0 | DISCHARGE

## 2022-08-30 RX ORDER — SUCRALFATE 1 G
10 TABLET ORAL
Qty: 300 | Refills: 0
Start: 2022-08-30 | End: 2022-09-12

## 2022-08-30 RX ORDER — LEVOTHYROXINE SODIUM 125 MCG
25 TABLET ORAL DAILY
Refills: 0 | Status: DISCONTINUED | OUTPATIENT
Start: 2022-08-30 | End: 2022-09-13

## 2022-08-30 RX ORDER — PANTOPRAZOLE SODIUM 20 MG/1
40 TABLET, DELAYED RELEASE ORAL
Refills: 0 | Status: DISCONTINUED | OUTPATIENT
Start: 2022-08-30 | End: 2022-09-13

## 2022-08-30 RX ORDER — APIXABAN 2.5 MG/1
1 TABLET, FILM COATED ORAL
Qty: 0 | Refills: 0 | DISCHARGE

## 2022-08-30 RX ORDER — SIMVASTATIN 20 MG/1
1 TABLET, FILM COATED ORAL
Qty: 0 | Refills: 0 | DISCHARGE

## 2022-08-30 RX ORDER — ATORVASTATIN CALCIUM 80 MG/1
40 TABLET, FILM COATED ORAL AT BEDTIME
Refills: 0 | Status: DISCONTINUED | OUTPATIENT
Start: 2022-08-30 | End: 2022-09-13

## 2022-08-30 NOTE — DISCHARGE NOTE NURSING/CASE MANAGEMENT/SOCIAL WORK - NSDCFUADDAPPT_GEN_ALL_CORE_FT
Follow up with Dr. Dowell- our office will contact you in 3-5 days to schedule this appointment. Please call [phone] with questions or concerns.    Continue Eliquis without any interruption.  Start Protonix twice daily for 2 weeks, then decrease to once daily for 8 weeks.  Start Carafate twice daily for weeks.  Continue all your usual home medications as usual.

## 2022-08-30 NOTE — DISCHARGE NOTE PROVIDER - CARE PROVIDER_API CALL
Toro Dowell)  Cardiac Electrophysiology; Cardiovascular Disease; Internal Medicine  [address] [place] [id number]  Phone: [phone]  Fax: [phone]  Established Patient  Follow Up Time: 1 month   Toro Dowell)  Cardiac Electrophysiology; Cardiovascular Disease; Internal Medicine  [address] [place] [address]  Phone: [phone]  Fax: [phone]  Established Patient  Follow Up Time: 1 month    Zia Jama)  Cardiovascular Disease; Internal Medicine  [address] [facility], [address]  [place] [address]  Phone: [phone]  Fax: [phone]  Follow Up Time:

## 2022-08-30 NOTE — DISCHARGE NOTE PROVIDER - NSDCMRMEDTOKEN_GEN_ALL_CORE_FT
Eliquis 5 mg oral tablet: 1 tab(s) orally 2 times a day  levothyroxine 25 mcg (0.025 mg) oral tablet: 1 tab(s) orally once a day  simvastatin 20 mg oral tablet: 1 tab(s) orally once a day   Eliquis 5 mg oral tablet: 1 tab(s) orally 2 times a day  levothyroxine 25 mcg (0.025 mg) oral tablet: 1 tab(s) orally once a day  pantoprazole 40 mg oral delayed release tablet: 1 tab(s) orally 2 times a day x 14 days then decrease to once daily x 6 weeks then stop   simvastatin 20 mg oral tablet: 1 tab(s) orally once a day  sucralfate 1 g/10 mL oral suspension: 10 milliliter(s) orally 2 times a day x 14 days then stop

## 2022-08-30 NOTE — DISCHARGE NOTE PROVIDER - PROVIDER TOKENS
PROVIDER:[TOKEN:[[id number]],FOLLOWUP:[1 month],ESTABLISHEDPATIENT:[T]] PROVIDER:[TOKEN:[[id number]],FOLLOWUP:[1 month],ESTABLISHEDPATIENT:[T]],PROVIDER:[TOKEN:[[id number]]]

## 2022-08-30 NOTE — PROGRESS NOTE ADULT - SUBJECTIVE AND OBJECTIVE BOX
PROCEDURE(S): Radiofrequency Ablation of Atrial Fibrillation    ELECTROPHYSIOLOGIST(S): Toro Dowell MD         COMPLICATIONS:  none        DISPOSITION:  observation     CONDITION: stable      Pt doing well s/p atrial fibrillation ablation (WAVA/PVI) via b/l FV (Hemostasis via Vascade closure device). CV x 3. Denies complaint.       MEDICATIONS  (STANDING):  apixaban 5 milliGRAM(s) Oral two times a day  atorvastatin 40 milliGRAM(s) Oral at bedtime  levothyroxine 25 MICROGram(s) Oral daily  pantoprazole    Tablet 40 milliGRAM(s) Oral two times a day  sodium chloride 0.9% Bolus 500 milliLiter(s) IV Bolus once  sucralfate suspension 1 Gram(s) Oral two times a day    MEDICATIONS  (PRN):  acetaminophen     Tablet .. 650 milliGRAM(s) Oral every 6 hours PRN Mild Pain (1 - 3)  ALPRAZolam 0.25 milliGRAM(s) Oral every 6 hours PRN anxiety/ insomnia  aluminum hydroxide/magnesium hydroxide/simethicone Suspension 30 milliLiter(s) Oral every 4 hours PRN Dyspepsia  benzocaine 15 mG/menthol 3.6 mG Lozenge 1 Lozenge Oral every 3 hours PRN Sore Throat  ondansetron Injectable 4 milliGRAM(s) IV Push every 8 hours PRN Nausea and/or Vomiting      Allergies  No Known Allergies      Exam:   HR:   BP:   RR:   SpO2:     VSS, NAD, A&O x 3  Card: S1/S2, RRR, no m/g/r  Resp: lungs CTA b/l  Abd: S/NT/ND  Groins: hemostatic sutures in place; sites C/D/I; no bleeding, hematoma, erythema, exudate or edema  Ext: no edema; distal pulses intact    I/Os: net +     ECG:    Assessment:   61yo Female with PMH of prior HTN (off medication now), HLD, hypothyroidism, sinus bradycardia and symptomatic paroxysmal atrial fibrillation. In July, she felt sudden very bothersome palpitations lasting ~ 5 hours which correlated with AF w/ RVR. She denies any CP, SOB, GIL, or syncope. She was started on eliquis which has been uninterrupted. She presented electively today for and is now status post uncomplicated radiofrequency ablation of atrial fibrillation (WAVA/PVI) via b/l FV (Hemostasis via Vascade closure device).      Plan:   500cc IVF bolus for hypotension  Bedrest x 2 hours, then OOB with assistance and progress as tolerated.   Radial art line to be removed once pt fully awake with stable vitals >1 hour post op.   Pending groin status: 5 mg PO eliquis to resume at 18:00 tonight.   Continue home medications.   Start Protonix 40mg twice daily x 2 weeks, then once daily x 6 weeks.   Carafate 1gm BID x 2 weeks.   Strict I/Os.  Please encourage incentive spirometry and ambulation once able.  Observation and monitoring on telemetry overnight with anticipated discharge in the AM and outpt follow up in 2-4 weeks.  PROCEDURE(S): Radiofrequency Ablation of Atrial Fibrillation    ELECTROPHYSIOLOGIST(S): Toro Dowell MD         COMPLICATIONS:  none        DISPOSITION:  observation     CONDITION: stable      Pt doing well s/p atrial fibrillation ablation (WAVA/PVI) via b/l FV (Hemostasis via Vascade closure device). CV x 3. Denies complaint.       MEDICATIONS  (STANDING):  apixaban 5 milliGRAM(s) Oral two times a day  atorvastatin 40 milliGRAM(s) Oral at bedtime  levothyroxine 25 MICROGram(s) Oral daily  pantoprazole    Tablet 40 milliGRAM(s) Oral two times a day  sodium chloride 0.9% Bolus 500 milliLiter(s) IV Bolus once  sucralfate suspension 1 Gram(s) Oral two times a day    MEDICATIONS  (PRN):  acetaminophen     Tablet .. 650 milliGRAM(s) Oral every 6 hours PRN Mild Pain (1 - 3)  ALPRAZolam 0.25 milliGRAM(s) Oral every 6 hours PRN anxiety/ insomnia  aluminum hydroxide/magnesium hydroxide/simethicone Suspension 30 milliLiter(s) Oral every 4 hours PRN Dyspepsia  benzocaine 15 mG/menthol 3.6 mG Lozenge 1 Lozenge Oral every 3 hours PRN Sore Throat  ondansetron Injectable 4 milliGRAM(s) IV Push every 8 hours PRN Nausea and/or Vomiting      Allergies  No Known Allergies      Exam:   HR:   BP:   RR:   SpO2:     VSS, NAD, A&O x 3  Card: S1/S2, RRR, no m/g/r  Resp: lungs CTA b/l  Abd: S/NT/ND  Groins: sites C/D/I; no bleeding, hematoma, erythema, exudate or edema  Ext: no edema; distal pulses intact    I/Os: net +     ECG:    Assessment:   61yo Female with PMH of prior HTN (off medication now), HLD, hypothyroidism, sinus bradycardia and symptomatic paroxysmal atrial fibrillation. In July, she felt sudden very bothersome palpitations lasting ~ 5 hours which correlated with AF w/ RVR. She denies any CP, SOB, GIL, or syncope. She was started on eliquis which has been uninterrupted. She presented electively today for and is now status post uncomplicated radiofrequency ablation of atrial fibrillation (WAVA/PVI) via b/l FV (Hemostasis via Vascade closure device).      Plan:   500cc IVF bolus for hypotension  Bedrest x 2 hours, then OOB with assistance and progress as tolerated.   Radial art line to be removed once pt fully awake with stable vitals >1 hour post op.   Pending groin status: 5 mg PO eliquis to resume at 18:00 tonight.   Continue home medications.   Start Protonix 40mg twice daily x 2 weeks, then once daily x 6 weeks.   Carafate 1gm BID x 2 weeks.   Strict I/Os.  Please encourage incentive spirometry and ambulation once able.  Observation and monitoring on telemetry overnight with anticipated discharge in the AM and outpt follow up in 2-4 weeks.  PROCEDURE(S): Radiofrequency Ablation of Atrial Fibrillation    ELECTROPHYSIOLOGIST(S): Toro Dowell MD         COMPLICATIONS:  none        DISPOSITION:  observation     CONDITION: stable      Pt doing well s/p atrial fibrillation ablation (WAVA/PVI) via b/l FV (Hemostasis via Vascade closure device). CV x 3 (360J). Denies complaint.       MEDICATIONS  (STANDING):  apixaban 5 milliGRAM(s) Oral two times a day  atorvastatin 40 milliGRAM(s) Oral at bedtime  levothyroxine 25 MICROGram(s) Oral daily  pantoprazole    Tablet 40 milliGRAM(s) Oral two times a day  sodium chloride 0.9% Bolus 500 milliLiter(s) IV Bolus once  sucralfate suspension 1 Gram(s) Oral two times a day    MEDICATIONS  (PRN):  acetaminophen     Tablet .. 650 milliGRAM(s) Oral every 6 hours PRN Mild Pain (1 - 3)  ALPRAZolam 0.25 milliGRAM(s) Oral every 6 hours PRN anxiety/ insomnia  aluminum hydroxide/magnesium hydroxide/simethicone Suspension 30 milliLiter(s) Oral every 4 hours PRN Dyspepsia  benzocaine 15 mG/menthol 3.6 mG Lozenge 1 Lozenge Oral every 3 hours PRN Sore Throat  ondansetron Injectable 4 milliGRAM(s) IV Push every 8 hours PRN Nausea and/or Vomiting      Allergies  No Known Allergies      Exam:   HR: 70  BP: 119/86  RR: 14  SpO2: 95%    VSS, NAD, sleepy from anesthesia but arousable  Card: S1/S2, RRR, no m/g/r  Resp: lungs CTA b/l  Abd: S/NT/ND  Groins: sites C/D/I; no bleeding, hematoma, erythema, exudate or edema  Ext: no edema; distal pulses intact    I/Os: net + 1805    ECG:    Assessment:   61yo Female with PMH of prior HTN (off medication now), HLD, hypothyroidism, sinus bradycardia and symptomatic paroxysmal atrial fibrillation. In July, she felt sudden very bothersome palpitations lasting ~ 5 hours which correlated with AF w/ RVR. She denies any CP, SOB, GIL, or syncope. She was started on eliquis which has been uninterrupted. She presented electively today for and is now status post uncomplicated radiofrequency ablation of atrial fibrillation (WAVA/PVI) via b/l FV (Hemostasis via Vascade closure device).      Plan:   500cc IVF bolus for hypotension  Bedrest x 2 hours, then OOB with assistance and progress as tolerated.   Pending groin status: 5 mg PO eliquis to resume at 18:00 tonight.   Continue home medications.   Start Protonix 40mg twice daily x 2 weeks, then once daily x 6 weeks.   Carafate 1gm BID x 2 weeks.   Strict I/Os.  Please encourage incentive spirometry and ambulation once able.  Observation and monitoring on telemetry overnight with anticipated discharge in the AM and outpt follow up in 2-4 weeks.  PROCEDURE(S): Radiofrequency Ablation of Atrial Fibrillation    ELECTROPHYSIOLOGIST(S): Toro Dowell MD         COMPLICATIONS:  none        DISPOSITION:  observation     CONDITION: stable      Pt doing well s/p atrial fibrillation ablation (WAVA/PVI) via b/l FV (Hemostasis via Vascade closure device). CV x 3 (360J). Denies complaint.       MEDICATIONS  (STANDING):  apixaban 5 milliGRAM(s) Oral two times a day  atorvastatin 40 milliGRAM(s) Oral at bedtime  levothyroxine 25 MICROGram(s) Oral daily  pantoprazole    Tablet 40 milliGRAM(s) Oral two times a day  sodium chloride 0.9% Bolus 500 milliLiter(s) IV Bolus once  sucralfate suspension 1 Gram(s) Oral two times a day    MEDICATIONS  (PRN):  acetaminophen     Tablet .. 650 milliGRAM(s) Oral every 6 hours PRN Mild Pain (1 - 3)  ALPRAZolam 0.25 milliGRAM(s) Oral every 6 hours PRN anxiety/ insomnia  aluminum hydroxide/magnesium hydroxide/simethicone Suspension 30 milliLiter(s) Oral every 4 hours PRN Dyspepsia  benzocaine 15 mG/menthol 3.6 mG Lozenge 1 Lozenge Oral every 3 hours PRN Sore Throat  ondansetron Injectable 4 milliGRAM(s) IV Push every 8 hours PRN Nausea and/or Vomiting      Allergies  No Known Allergies      Exam:   HR: 70  BP: 119/86  RR: 14  SpO2: 95%    VSS, NAD, sleepy from anesthesia but arousable  Card: S1/S2, RRR, no m/g/r  Resp: lungs CTA b/l  Abd: S/NT/ND  Groins: sites C/D/I; no bleeding, hematoma, erythema, exudate or edema  Ext: no edema; distal pulses intact    I/Os: net + 1805    ECG: Sinus rhythm w/ intact conduction and intervals. q waves inferiorly.     Assessment:   61yo Female with PMH of prior HTN (off medication now), HLD, hypothyroidism, sinus bradycardia and symptomatic paroxysmal atrial fibrillation. In July, she felt sudden very bothersome palpitations lasting ~ 5 hours which correlated with AF w/ RVR. She denies any CP, SOB, GIL, or syncope. She was started on eliquis which has been uninterrupted. She presented electively today for and is now status post uncomplicated radiofrequency ablation of atrial fibrillation (WAVA/PVI) via b/l FV (Hemostasis via Vascade closure device).      Plan:   500cc IVF bolus (postprocedure CATRACHITO with low volume; no effusion).  Bedrest x 2 hours, then OOB with assistance and progress as tolerated.   Pending groin status: 5 mg PO Eliquis to resume at 18:00 tonight.   Continue home medications.   Start Protonix 40mg twice daily x 2 weeks, then once daily x 6 weeks.   Carafate 1gm BID x 2 weeks.   Strict I/Os.  Please encourage incentive spirometry and ambulation once able.  Observation and monitoring on telemetry overnight with anticipated discharge in the AM and outpt follow up in 2-4 weeks.  PROCEDURE(S): Radiofrequency Ablation of Atrial Fibrillation    ELECTROPHYSIOLOGIST(S): Toro Dowell MD         COMPLICATIONS:  none        DISPOSITION:  observation     CONDITION: stable      Pt doing well s/p atrial fibrillation ablation (WAVA/PVI) via b/l FV (Hemostasis via Vascade closure device). CV x 3 (360J). Denies complaint.       MEDICATIONS  (STANDING):  apixaban 5 milliGRAM(s) Oral two times a day  atorvastatin 40 milliGRAM(s) Oral at bedtime  levothyroxine 25 MICROGram(s) Oral daily  pantoprazole    Tablet 40 milliGRAM(s) Oral two times a day  sodium chloride 0.9% Bolus 500 milliLiter(s) IV Bolus once  sucralfate suspension 1 Gram(s) Oral two times a day    MEDICATIONS  (PRN):  acetaminophen     Tablet .. 650 milliGRAM(s) Oral every 6 hours PRN Mild Pain (1 - 3)  ALPRAZolam 0.25 milliGRAM(s) Oral every 6 hours PRN anxiety/ insomnia  aluminum hydroxide/magnesium hydroxide/simethicone Suspension 30 milliLiter(s) Oral every 4 hours PRN Dyspepsia  benzocaine 15 mG/menthol 3.6 mG Lozenge 1 Lozenge Oral every 3 hours PRN Sore Throat  ondansetron Injectable 4 milliGRAM(s) IV Push every 8 hours PRN Nausea and/or Vomiting      Allergies  No Known Allergies      Exam:   HR: 70  BP: 119/86  RR: 14  SpO2: 95%    VSS, NAD, sleepy from anesthesia but arousable  Card: S1/S2, RRR, no m/g/r  Resp: lungs CTA b/l  Abd: S/NT/ND  Groins: sites C/D/I; no bleeding, hematoma, erythema, exudate or edema  Ext: no edema; distal pulses intact    I/Os: net + 1805    ECG: Sinus rhythm w/ intact conduction and intervals. q waves inferiorly (noted previously)    Assessment:   63yo Female with PMH of prior HTN (off medication now), HLD, hypothyroidism, sinus bradycardia and symptomatic paroxysmal atrial fibrillation. In July, she felt sudden very bothersome palpitations lasting ~ 5 hours which correlated with AF w/ RVR. She denies any CP, SOB, GIL, or syncope. She was started on eliquis which has been uninterrupted. She presented electively today for and is now status post uncomplicated radiofrequency ablation of atrial fibrillation (WAVA/PVI) via b/l FV (Hemostasis via Vascade closure device).      Plan:   500cc IVF bolus (postprocedure CATRACHITO with low volume; no effusion).  Bedrest x 2 hours, then OOB with assistance and progress as tolerated.   Pending groin status: 5 mg PO Eliquis to resume at 18:00 tonight.   Continue home medications.   Start Protonix 40mg twice daily x 2 weeks, then once daily x 6 weeks.   Carafate 1gm BID x 2 weeks.   Strict I/Os.  Please encourage incentive spirometry and ambulation once able.  Observation and monitoring on telemetry overnight with anticipated discharge in the AM and outpt follow up in 2-4 weeks.  PROCEDURE(S): Radiofrequency Ablation of Atrial Fibrillation    ELECTROPHYSIOLOGIST(S): Toro Dowell MD         COMPLICATIONS:  none        DISPOSITION:  observation     CONDITION: stable      Pt doing well s/p atrial fibrillation ablation (WAVA/PVI) via b/l FV (Hemostasis via Vascade closure device). CV x 3 (360J). Denies complaint.       MEDICATIONS  (STANDING):  apixaban 5 milliGRAM(s) Oral two times a day  atorvastatin 40 milliGRAM(s) Oral at bedtime  levothyroxine 25 MICROGram(s) Oral daily  pantoprazole    Tablet 40 milliGRAM(s) Oral two times a day  sodium chloride 0.9% Bolus 500 milliLiter(s) IV Bolus once  sucralfate suspension 1 Gram(s) Oral two times a day    MEDICATIONS  (PRN):  acetaminophen     Tablet .. 650 milliGRAM(s) Oral every 6 hours PRN Mild Pain (1 - 3)  ALPRAZolam 0.25 milliGRAM(s) Oral every 6 hours PRN anxiety/ insomnia  aluminum hydroxide/magnesium hydroxide/simethicone Suspension 30 milliLiter(s) Oral every 4 hours PRN Dyspepsia  benzocaine 15 mG/menthol 3.6 mG Lozenge 1 Lozenge Oral every 3 hours PRN Sore Throat  ondansetron Injectable 4 milliGRAM(s) IV Push every 8 hours PRN Nausea and/or Vomiting      Allergies  No Known Allergies      Exam:   HR: 70  BP: 119/86  RR: 14  SpO2: 95%    VSS, NAD, sleepy from anesthesia but arousable  Card: S1/S2, RRR, no m/g/r  Resp: lungs CTA b/l  Abd: S/NT/ND  Groins: sites C/D/I; no bleeding, hematoma, erythema, exudate or edema  Ext: no edema; distal pulses intact    I/Os: net + 1805    ECG: Sinus rhythm w/ intact conduction and intervals. q waves inferiorly (noted previously)    Assessment:   61yo Female with PMH of prior HTN (off medication now), HLD, hypothyroidism, sinus bradycardia and symptomatic paroxysmal atrial fibrillation. In July, she felt sudden very bothersome palpitations lasting ~ 5 hours which correlated with AF w/ RVR. She denies any CP, SOB, GIL, or syncope. She was started on eliquis which has been uninterrupted. She presented electively today for and is now status post uncomplicated radiofrequency ablation of atrial fibrillation (WAVA/PVI) via b/l FV (Hemostasis via Vascade closure device).      Plan:   500cc IVF bolus (postprocedure CATRACHITO with low volume; no effusion).  Bedrest x 2 hours, then OOB with assistance and progress as tolerated.   Pending groin status: 5 mg PO Eliquis to resume at 18:00 tonight.   Continue home medications.   Start Protonix 40mg twice daily x 2 weeks, then once daily x 6 weeks.   Carafate 1gm BID x 2 weeks.   Strict I/Os.  Please encourage incentive spirometry and ambulation once able.  Observation and monitoring on telemetry with anticipated discharge later today.  Outpt follow up in 2-4 weeks.  PROCEDURE(S): Radiofrequency Ablation of Atrial Fibrillation    ELECTROPHYSIOLOGIST(S): Toro Dowell MD         COMPLICATIONS:  none        DISPOSITION:  observation     CONDITION: stable      Pt doing well s/p atrial fibrillation ablation (WAVA/PVI) via b/l FV (Hemostasis via Vascade closure device). CV x 3 (360J). Denies complaint.       MEDICATIONS  (STANDING):  apixaban 5 milliGRAM(s) Oral two times a day  atorvastatin 40 milliGRAM(s) Oral at bedtime  levothyroxine 25 MICROGram(s) Oral daily  pantoprazole    Tablet 40 milliGRAM(s) Oral two times a day  sodium chloride 0.9% Bolus 500 milliLiter(s) IV Bolus once  sucralfate suspension 1 Gram(s) Oral two times a day    MEDICATIONS  (PRN):  acetaminophen     Tablet .. 650 milliGRAM(s) Oral every 6 hours PRN Mild Pain (1 - 3)  ALPRAZolam 0.25 milliGRAM(s) Oral every 6 hours PRN anxiety/ insomnia  aluminum hydroxide/magnesium hydroxide/simethicone Suspension 30 milliLiter(s) Oral every 4 hours PRN Dyspepsia  benzocaine 15 mG/menthol 3.6 mG Lozenge 1 Lozenge Oral every 3 hours PRN Sore Throat  ondansetron Injectable 4 milliGRAM(s) IV Push every 8 hours PRN Nausea and/or Vomiting      Allergies  No Known Allergies      Exam:   HR: 70  BP: 119/86  RR: 14  SpO2: 95%    VSS, NAD, sleepy from anesthesia but arousable  Card: S1/S2, RRR, no m/g/r  Resp: lungs CTA b/l  Abd: S/NT/ND  Groins: sites C/D/I; no bleeding, hematoma, erythema, exudate or edema  Ext: no edema; distal pulses intact    I/Os: net + 1805    ECG: Sinus rhythm w/ intact conduction and intervals.     Assessment:   63yo Female with PMH of prior HTN (off medication now), HLD, hypothyroidism, sinus bradycardia and symptomatic paroxysmal atrial fibrillation. In July, she felt sudden very bothersome palpitations lasting ~ 5 hours which correlated with AF w/ RVR. She denies any CP, SOB, GIL, or syncope. She was started on eliquis which has been uninterrupted. She presented electively today for and is now status post uncomplicated radiofrequency ablation of atrial fibrillation (WAVA/PVI) via b/l FV (Hemostasis via Vascade closure device).      Plan:   500cc IVF bolus (postprocedure CATRACHITO with low volume; no effusion).  Bedrest x 2 hours, then OOB with assistance and progress as tolerated.   Pending groin status: 5 mg PO Eliquis to resume at 18:00 tonight.   Continue home medications.   Start Protonix 40mg twice daily x 2 weeks, then once daily x 6 weeks.   Carafate 1gm BID x 2 weeks.   Strict I/Os.  Please encourage incentive spirometry and ambulation once able.  Observation and monitoring on telemetry with anticipated discharge later today.  Outpt follow up in 2-4 weeks.

## 2022-08-30 NOTE — DISCHARGE NOTE PROVIDER - HOSPITAL COURSE
61yo Female with PMH of prior HTN (off medication now), HLD, hypothyroidism, sinus bradycardia and symptomatic paroxysmal atrial fibrillation. In July, she felt sudden very bothersome palpitations lasting ~ 5 hours which correlated with AF w/ RVR. She denies any CP, SOB, GIL, or syncope. She was started on eliquis which has been uninterrupted. She presented electively today for and is now status post uncomplicated radiofrequency ablation of atrial fibrillation (WAVA/PVI) via b/l FV (Hemostasis via Vascade closure device).

## 2022-08-30 NOTE — DISCHARGE NOTE PROVIDER - NSDCFUADDAPPT_GEN_ALL_CORE_FT
Our office will contact you in 3-5 days to schedule this appointment. Please call [phone] with questions or concerns. Follow up with Dr. Dowell- our office will contact you in 3-5 days to schedule this appointment. Please call [phone] with questions or concerns.    Continue Eliquis without any interruption.  Start Protonix twice daily for 2 weeks, then decrease to once daily for 8 weeks.  Start Carafate twice daily for weeks.  Continue all your usual home medications as usual.

## 2022-08-30 NOTE — DISCHARGE NOTE PROVIDER - NSDCCPTREATMENT_GEN_ALL_CORE_FT
PRINCIPAL PROCEDURE  Procedure: Radiofrequency ablation, arrhythmogenic focus, for atrial fibrillation  Findings and Treatment: - Bruising at the groin, sometimes extending down the leg, and/or a small lump under the skin at the groin access site is normal and will resolve within 2 – 3 weeks.   - Occasional skipped beats or palpitations that last for a few beats are common and generally resolve within 1-2 months.   - You make walk and take stairs at a regular pace.   - Do not perform any exercise more strenuous than walking for 1 week.   - Do not strain or lift heavy objects for 1 week.  - You may shower the day after the procedure.  - Do not soak in water (such as tub baths, hot tubs, swimming, etc.) for 1 week.   - You may resume all other activities the day after the procedure.  Call your doctor if:   - you notice bleeding, redness, drainage, swelling, increased tenderness or a hot sensation around the catheter insertion site.   - your temperature is greater than 100 degrees F for more than 24 hours.  - your rapid heart rhythm returns.  - you have any questions or concerns regarding the procedure.  If significant bleeding and/or a large lump (the size of a golf ball or bigger) occurs:  - Lie flat and apply continuous direct pressure just above the puncture site for at least 10 minutes  - If the issue resolves, notify your physician immediately.    - If the bleeding cannot be controlled, please seek immediate medical attention.  If you experience increased difficulty breathing or chest pain, or if you faint or have dizzy spells, please seek immediate medical attention.

## 2022-08-30 NOTE — DISCHARGE NOTE NURSING/CASE MANAGEMENT/SOCIAL WORK - PATIENT PORTAL LINK FT
You can access the FollowMyHealth Patient Portal offered by [facility] by registering at the following website: [email or web address]. By joining [employer]’s FollowMyHealth portal, you will also be able to view your health information using other applications (apps) compatible with our system.

## 2022-08-30 NOTE — DISCHARGE NOTE PROVIDER - CARE PROVIDERS DIRECT ADDRESSES
,DirectAddress_Unknown ,DirectAddress_Unknown,[email or web address]

## 2022-08-30 NOTE — DISCHARGE NOTE NURSING/CASE MANAGEMENT/SOCIAL WORK - NSDCPEFALRISK_GEN_ALL_CORE
For information on Fall & Injury Prevention, visit: [email or web address] OR  [email or web address] OR  https://www.cdc.gov/steadi/patient.html

## 2022-08-30 NOTE — PROGRESS NOTE ADULT - SUBJECTIVE AND OBJECTIVE BOX
Pt arrived for scheduled AF ablation with Dr. Dowlel. PST performed on . Labs reviewed. NPO > 10 hours confirmed. Eliquis uninterrupted. Last took Eliquis at 6AM.    Pt is a 63yo Female with PMH of prior HTN (off medication now), HLD, hypothyroidism, sinus bradycardia and symptomatic paroxysmal atrial fibrillation. She presented to [facility] s/p near syncope and was found to be bradycardic (40s) and hypotensive (60s/40s). Her ARB was discontinued. She followed up with Dr. Jama and had a stress test which showed good response to exercise; negative for ischemia. A one month cardiac monitor revealed new onset AF (longest episode 93min and overall burden 0.2%). She was referred to Dr. Dowell for further management and started on ASA ([id number] =1). In July, she felt sudden very bothersome palpitations lasting ~ 5 hours which correlated with AF w/ RVR. She denies any CP, SOB, GIL, or syncope. She was started on eliquis and now presents electively for atrial fibrillation ablation, CATRACHITO deferred. Pt has been on uninterrupted Eliquis.     Cardiologist: Dr Chowdhury    Cardiology summary:  EK2022; Sinus bradycardia   Echo 2021; Summary:   1. Left ventricular ejection fraction, by visual estimation, is 65 to 70%.   2. Normal global left ventricular systolic function.   3. Diastolic function indeterminate.   4. Mildly enlarged right ventricle with normal systolic function, inadequate estimation of RVSP.   5. Mildly enlarged left atrium.   6. Mild mitral valve regurgitation.   7. Mild tricuspid regurgitation.   8.Mobile intra-atrial septum.   9. Trivial pericardial effusion.      STRESS TEST 2021 IMPRESSIONS:  Exercise capacity: 8 METS, Average for age and gender. 85%  of MPHR.  Chest Pain: No chest pain with exercise.  Symptom: Shortness of breath.  HR Response: Appropriate.  BP Response: Appropriate.  Heart Rhythm: Sinus Bradycardia - 55 BPM.  ECG Abnormalities: There were no diagnostic changes.  Arrhythmia: Occasional APC's.  Rivas Treadmill Score: 8

## 2022-08-30 NOTE — PROGRESS NOTE ADULT - NS ATTEND AMEND GEN_ALL_CORE FT
Seen and examined, well known to me. young pt with symptomatic PAF. Meds limited due to SB. Here for AF ablation on uninterrupted AC, confirmed compliance. All B/R/A discussed. Risks explained and include but not limited to vascular complication, bleeding, infection, stroke, heart attack, cardiac perforation, PV stenosis, phrenic nerve or esoph. injury and death. She provided informed consent.
Agree

## 2022-08-31 ENCOUNTER — NON-APPOINTMENT (OUTPATIENT)
Age: 62
End: 2022-08-31

## 2022-09-01 PROBLEM — K21.9 GASTRO-ESOPHAGEAL REFLUX DISEASE WITHOUT ESOPHAGITIS: Chronic | Status: ACTIVE | Noted: 2019-03-25

## 2022-09-21 ENCOUNTER — OFFICE (OUTPATIENT)
Dept: URBAN - METROPOLITAN AREA CLINIC 63 | Facility: CLINIC | Age: 62
Setting detail: OPHTHALMOLOGY
End: 2022-09-21
Payer: COMMERCIAL

## 2022-09-21 VITALS — HEIGHT: 55 IN

## 2022-09-21 DIAGNOSIS — H35.033: ICD-10-CM

## 2022-09-21 DIAGNOSIS — H11.153: ICD-10-CM

## 2022-09-21 DIAGNOSIS — H43.393: ICD-10-CM

## 2022-09-21 DIAGNOSIS — H25.13: ICD-10-CM

## 2022-09-21 PROCEDURE — 92002 INTRM OPH EXAM NEW PATIENT: CPT | Performed by: STUDENT IN AN ORGANIZED HEALTH CARE EDUCATION/TRAINING PROGRAM

## 2022-09-21 ASSESSMENT — KERATOMETRY
OD_K1POWER_DIOPTERS: 45.00
OD_AXISANGLE_DEGREES: 126
OS_K1POWER_DIOPTERS: 45.50
OS_K2POWER_DIOPTERS: 45.75
OS_AXISANGLE_DEGREES: 067
OD_K2POWER_DIOPTERS: 45.25

## 2022-09-21 ASSESSMENT — REFRACTION_MANIFEST
OD_SPHERE: +0.50
OS_ADD: +2.50
OD_ADD: +2.25
OD_SPHERE: +0.75
OD_VA1: 20/20
OS_AXIS: 0.00
OS_ADD: +2.25
OD_CYLINDER: -0.75
OS_SPHERE: +0.25
OD_ADD: +2.50
OS_CYLINDER: 0.00
OD_CYLINDER: -0.50
OD_VA1: 20/20
OS_VA1: 20/20
OD_AXIS: 75
OS_SPHERE: +0.50
OD_AXIS: 075
OS_VA1: 20/20

## 2022-09-21 ASSESSMENT — SPHEQUIV_DERIVED
OS_SPHEQUIV: 0.5
OD_SPHEQUIV: 1.5
OD_SPHEQUIV: 0.375
OS_SPHEQUIV: 1.25
OD_SPHEQUIV: 0.25

## 2022-09-21 ASSESSMENT — TONOMETRY
OD_IOP_MMHG: 15
OS_IOP_MMHG: 16

## 2022-09-21 ASSESSMENT — REFRACTION_AUTOREFRACTION
OS_SPHERE: +1.25
OD_AXIS: 055
OD_CYLINDER: -0.50
OD_SPHERE: +1.75
OS_CYLINDER: 0.00
OS_AXIS: 000

## 2022-09-21 ASSESSMENT — REFRACTION_CURRENTRX
OD_OVR_VA: 20/
OD_SPHERE: +2.50
OS_SPHERE: +2.50
OS_OVR_VA: 20/

## 2022-09-21 ASSESSMENT — VISUAL ACUITY
OS_BCVA: 20/25
OD_BCVA: 20/25-1

## 2022-09-21 ASSESSMENT — AXIALLENGTH_DERIVED
OS_AL: 22.389
OS_AL: 22.6553
OD_AL: 22.4669
OD_AL: 22.8716
OD_AL: 22.9175

## 2022-09-21 ASSESSMENT — CONFRONTATIONAL VISUAL FIELD TEST (CVF)
OS_FINDINGS: FULL
OD_FINDINGS: FULL

## 2022-10-03 ENCOUNTER — APPOINTMENT (OUTPATIENT)
Dept: ELECTROPHYSIOLOGY | Facility: CLINIC | Age: 62
End: 2022-10-03

## 2022-10-03 ENCOUNTER — NON-APPOINTMENT (OUTPATIENT)
Age: 62
End: 2022-10-03

## 2022-10-03 VITALS
SYSTOLIC BLOOD PRESSURE: 127 MMHG | TEMPERATURE: 98 F | HEART RATE: 54 BPM | DIASTOLIC BLOOD PRESSURE: 77 MMHG | WEIGHT: 153 LBS | HEIGHT: 66 IN | OXYGEN SATURATION: 99 % | BODY MASS INDEX: 24.59 KG/M2

## 2022-10-03 PROCEDURE — 93000 ELECTROCARDIOGRAM COMPLETE: CPT

## 2022-10-03 PROCEDURE — 99214 OFFICE O/P EST MOD 30 MIN: CPT | Mod: 25

## 2022-10-03 NOTE — HISTORY OF PRESENT ILLNESS
[FreeTextEntry1] : This is a 62 year old Female with PMH of prior HTN (off medication now), HLD, hypothyroidism, sinus bradycardia and symptomatic paroxysmal atrial fibrillation. She presented to [facility] on 2021 with near syncope and was found to be bradycardic (40s) and hypotensive (60s/40s). Her ARB was discontinued. She followed up with Dr. Jama at [facility] and had a stress test which showed good response to exercise; negative for ischemia. A one month cardiac monitor revealed new onset AF (longest episode 93 min and overall burden 0.2%). She was referred to me in the [facility] office for further management and started on ASA ([id number] =1). In 2022, she felt sudden very bothersome palpitations lasting ~ 5 hours which correlated with AF w/ RVR. After deliberation, she underwent an AF ablation with me on 22 (RFA-PVI). \par \par She is here for follow up. She was not given any prior follow up. She took all medications as instructed and is still on eliquis. She had few brief (10-20 seconds) of chest tightness in the first month after ablation which resolved. Denies any groin bleeding or swelling. She denies any fevers, chills, cough, sweats, chest pain, palpitations, dyspnea on exertion, orthopnea, paroxysmal nocturnal dyspnea, peripheral edema, lightheadedness, dizziness, syncope, nausea, vomiting, melena, hematochezia, or hematemesis.\par \par Social history:\par Negative for smoking, illicit drugs or alcohol abuse.\par \par Family history:\par Father  in a park without clear cause. He was alcoholic. Negative for premature CAD or SCD. \par \par TESTS: \par EKG 10/3/22: Sinus  Bradycardia. Poor R wave progression. \par EK2022; Sinus bradycardia \par \par Echo 2021; Summary:\par  1. Left ventricular ejection fraction, by visual estimation, is 65 to 70%.\par  2. Normal global left ventricular systolic function.\par  3. Diastolic function indeterminate.\par  4. Mildly enlarged right ventricle with normal systolic function, inadequate estimation of RVSP.\par  5. Mildly enlarged left atrium.\par  6. Mild mitral valve regurgitation.\par  7. Mild tricuspid regurgitation.\par  8.Mobile intra-atrial septum.\par  9. Trivial pericardial effusion.\par \par \par STRESS TEST 2021 IMPRESSIONS:\par Exercise capacity: 8 METS, Average for age and gender. 85%of MPHR.\par Chest Pain: No chest pain with exercise.\par Symptom: Shortness of breath.\par HR Response: Appropriate.\par BP Response: Appropriate.\par Heart Rhythm: Sinus Bradycardia - 55 BPM.\par ECG Abnormalities: There were no diagnostic changes.\par Arrhythmia: Occasional APC's.\par Rivas Treadmill Score: 8

## 2022-10-03 NOTE — ASSESSMENT
[FreeTextEntry1] : Symptomatic paroxysmal AFIB, s/p ablation (RFA PVI on 8/30/22) with no symptomatic recurrence. Her CHADS-VASc score is 1-2 ( for gender and ? HTN). The diagnosis of HTN is not confirmed, she reports occasional high BP but not on meds).  She is using ECG -capable Apple watch that did not show any AF alerts. \par - After counseling, we agreed to c/w eliquis for now. Will consider changing this to aspirin 81 mg daily in next visit (3 months post ablation). \par - One week MCOT in 2 months. \par - Advised her to measure her BP frequently and discuss this with her cardiologist. \par \par Follow up in 3 months or sooner if needed.

## 2022-10-03 NOTE — REVIEW OF SYSTEMS
[Fever] : no fever [Chills] : no chills [SOB] : no shortness of breath [Leg Claudication] : no intermittent leg claudication [Syncope] : no syncope [Cough] : no cough [Wheezing] : no wheezing [Nausea] : no nausea [Vomiting] : no vomiting [Dizziness] : no dizziness

## 2022-11-15 ENCOUNTER — INPATIENT (INPATIENT)
Facility: HOSPITAL | Age: 62
LOS: 1 days | Discharge: HOME | DRG: 185 | End: 2022-11-17
Attending: SURGERY | Admitting: SURGERY
Payer: COMMERCIAL

## 2022-11-15 VITALS
HEART RATE: 51 BPM | OXYGEN SATURATION: 98 % | DIASTOLIC BLOOD PRESSURE: 78 MMHG | WEIGHT: 147.93 LBS | TEMPERATURE: 98 F | RESPIRATION RATE: 18 BRPM | SYSTOLIC BLOOD PRESSURE: 137 MMHG

## 2022-11-15 DIAGNOSIS — Z98.890 OTHER SPECIFIED POSTPROCEDURAL STATES: Chronic | ICD-10-CM

## 2022-11-15 DIAGNOSIS — Z90.49 ACQUIRED ABSENCE OF OTHER SPECIFIED PARTS OF DIGESTIVE TRACT: Chronic | ICD-10-CM

## 2022-11-15 DIAGNOSIS — S22.42XA MULTIPLE FRACTURES OF RIBS, LEFT SIDE, INITIAL ENCOUNTER FOR CLOSED FRACTURE: ICD-10-CM

## 2022-11-15 DIAGNOSIS — K21.9 GASTRO-ESOPHAGEAL REFLUX DISEASE WITHOUT ESOPHAGITIS: Chronic | ICD-10-CM

## 2022-11-15 LAB
ANION GAP SERPL CALC-SCNC: 10 MMOL/L — SIGNIFICANT CHANGE UP (ref 5–17)
BUN SERPL-MCNC: 13 MG/DL — SIGNIFICANT CHANGE UP (ref 8–20)
CALCIUM SERPL-MCNC: 9.2 MG/DL — SIGNIFICANT CHANGE UP (ref 8.4–10.5)
CHLORIDE SERPL-SCNC: 102 MMOL/L — SIGNIFICANT CHANGE UP (ref 96–108)
CO2 SERPL-SCNC: 28 MMOL/L — SIGNIFICANT CHANGE UP (ref 22–29)
CREAT SERPL-MCNC: 0.47 MG/DL — LOW (ref 0.5–1.3)
EGFR: 108 ML/MIN/1.73M2 — SIGNIFICANT CHANGE UP
GLUCOSE SERPL-MCNC: 80 MG/DL — SIGNIFICANT CHANGE UP (ref 70–99)
HCT VFR BLD CALC: 40 % — SIGNIFICANT CHANGE UP (ref 34.5–45)
HGB BLD-MCNC: 13.4 G/DL — SIGNIFICANT CHANGE UP (ref 11.5–15.5)
MAGNESIUM SERPL-MCNC: 2 MG/DL — SIGNIFICANT CHANGE UP (ref 1.6–2.6)
MCHC RBC-ENTMCNC: 31.4 PG — SIGNIFICANT CHANGE UP (ref 27–34)
MCHC RBC-ENTMCNC: 33.5 GM/DL — SIGNIFICANT CHANGE UP (ref 32–36)
MCV RBC AUTO: 93.7 FL — SIGNIFICANT CHANGE UP (ref 80–100)
PHOSPHATE SERPL-MCNC: 3.3 MG/DL — SIGNIFICANT CHANGE UP (ref 2.4–4.7)
PLATELET # BLD AUTO: 232 K/UL — SIGNIFICANT CHANGE UP (ref 150–400)
POTASSIUM SERPL-MCNC: 4.1 MMOL/L — SIGNIFICANT CHANGE UP (ref 3.5–5.3)
POTASSIUM SERPL-SCNC: 4.1 MMOL/L — SIGNIFICANT CHANGE UP (ref 3.5–5.3)
RAPID RVP RESULT: DETECTED
RBC # BLD: 4.27 M/UL — SIGNIFICANT CHANGE UP (ref 3.8–5.2)
RBC # FLD: 12.7 % — SIGNIFICANT CHANGE UP (ref 10.3–14.5)
RSV RNA SPEC QL NAA+PROBE: DETECTED
SARS-COV-2 RNA SPEC QL NAA+PROBE: SIGNIFICANT CHANGE UP
SODIUM SERPL-SCNC: 140 MMOL/L — SIGNIFICANT CHANGE UP (ref 135–145)
WBC # BLD: 7.02 K/UL — SIGNIFICANT CHANGE UP (ref 3.8–10.5)
WBC # FLD AUTO: 7.02 K/UL — SIGNIFICANT CHANGE UP (ref 3.8–10.5)

## 2022-11-15 PROCEDURE — 71250 CT THORAX DX C-: CPT | Mod: 26,MF

## 2022-11-15 PROCEDURE — 71045 X-RAY EXAM CHEST 1 VIEW: CPT | Mod: 26

## 2022-11-15 PROCEDURE — G1004: CPT

## 2022-11-15 PROCEDURE — 70450 CT HEAD/BRAIN W/O DYE: CPT | Mod: 26,ME

## 2022-11-15 PROCEDURE — 99285 EMERGENCY DEPT VISIT HI MDM: CPT

## 2022-11-15 RX ORDER — METHOCARBAMOL 500 MG/1
750 TABLET, FILM COATED ORAL EVERY 6 HOURS
Refills: 0 | Status: DISCONTINUED | OUTPATIENT
Start: 2022-11-15 | End: 2022-11-17

## 2022-11-15 RX ORDER — ACETAMINOPHEN 500 MG
650 TABLET ORAL EVERY 6 HOURS
Refills: 0 | Status: DISCONTINUED | OUTPATIENT
Start: 2022-11-15 | End: 2022-11-17

## 2022-11-15 RX ORDER — ATORVASTATIN CALCIUM 80 MG/1
40 TABLET, FILM COATED ORAL AT BEDTIME
Refills: 0 | Status: DISCONTINUED | OUTPATIENT
Start: 2022-11-15 | End: 2022-11-17

## 2022-11-15 RX ORDER — PANTOPRAZOLE SODIUM 20 MG/1
40 TABLET, DELAYED RELEASE ORAL
Refills: 0 | Status: DISCONTINUED | OUTPATIENT
Start: 2022-11-15 | End: 2022-11-17

## 2022-11-15 RX ORDER — OXYCODONE HYDROCHLORIDE 5 MG/1
5 TABLET ORAL EVERY 4 HOURS
Refills: 0 | Status: DISCONTINUED | OUTPATIENT
Start: 2022-11-15 | End: 2022-11-17

## 2022-11-15 RX ORDER — LIDOCAINE 4 G/100G
2 CREAM TOPICAL EVERY 24 HOURS
Refills: 0 | Status: DISCONTINUED | OUTPATIENT
Start: 2022-11-15 | End: 2022-11-17

## 2022-11-15 RX ORDER — APIXABAN 2.5 MG/1
5 TABLET, FILM COATED ORAL ONCE
Refills: 0 | Status: COMPLETED | OUTPATIENT
Start: 2022-11-15 | End: 2022-11-15

## 2022-11-15 RX ORDER — GABAPENTIN 400 MG/1
300 CAPSULE ORAL EVERY 8 HOURS
Refills: 0 | Status: DISCONTINUED | OUTPATIENT
Start: 2022-11-15 | End: 2022-11-17

## 2022-11-15 RX ORDER — LEVOTHYROXINE SODIUM 125 MCG
25 TABLET ORAL DAILY
Refills: 0 | Status: DISCONTINUED | OUTPATIENT
Start: 2022-11-15 | End: 2022-11-17

## 2022-11-15 RX ORDER — KETOROLAC TROMETHAMINE 30 MG/ML
15 SYRINGE (ML) INJECTION EVERY 6 HOURS
Refills: 0 | Status: DISCONTINUED | OUTPATIENT
Start: 2022-11-15 | End: 2022-11-16

## 2022-11-15 RX ADMIN — LIDOCAINE 2 PATCH: 4 CREAM TOPICAL at 16:29

## 2022-11-15 RX ADMIN — APIXABAN 5 MILLIGRAM(S): 2.5 TABLET, FILM COATED ORAL at 21:05

## 2022-11-15 RX ADMIN — OXYCODONE HYDROCHLORIDE 5 MILLIGRAM(S): 5 TABLET ORAL at 21:04

## 2022-11-15 RX ADMIN — METHOCARBAMOL 750 MILLIGRAM(S): 500 TABLET, FILM COATED ORAL at 18:20

## 2022-11-15 RX ADMIN — ATORVASTATIN CALCIUM 40 MILLIGRAM(S): 80 TABLET, FILM COATED ORAL at 21:03

## 2022-11-15 RX ADMIN — METHOCARBAMOL 750 MILLIGRAM(S): 500 TABLET, FILM COATED ORAL at 21:08

## 2022-11-15 RX ADMIN — GABAPENTIN 300 MILLIGRAM(S): 400 CAPSULE ORAL at 21:03

## 2022-11-15 RX ADMIN — PANTOPRAZOLE SODIUM 40 MILLIGRAM(S): 20 TABLET, DELAYED RELEASE ORAL at 16:29

## 2022-11-15 RX ADMIN — LIDOCAINE 2 PATCH: 4 CREAM TOPICAL at 19:44

## 2022-11-15 RX ADMIN — OXYCODONE HYDROCHLORIDE 5 MILLIGRAM(S): 5 TABLET ORAL at 17:58

## 2022-11-15 RX ADMIN — Medication 15 MILLIGRAM(S): at 18:21

## 2022-11-15 RX ADMIN — OXYCODONE HYDROCHLORIDE 5 MILLIGRAM(S): 5 TABLET ORAL at 16:29

## 2022-11-15 RX ADMIN — Medication 15 MILLIGRAM(S): at 18:36

## 2022-11-15 NOTE — ED PROVIDER NOTE - NS ED ATTENDING STATEMENT MOD
This was a shared visit with the MARGARITO. I reviewed and verified the documentation and independently performed the documented:

## 2022-11-15 NOTE — ED ADULT TRIAGE NOTE - CHIEF COMPLAINT QUOTE
Pt states she had a trip and fall on Friday in [place].  Pt fell on cement, injuring bilat knee, left hip and most pain is in left rib area pain.  Pt states she is unable to take a deep breath or sneeze.

## 2022-11-15 NOTE — H&P ADULT - ATTENDING COMMENTS
62F with PMH afib on Eliquis, HTN, dyslipidemia, hypothyroidism, obesity, osteopenia, GERD and PSH right rotator cuff repair, appendectomy, bunionectomy, right wrist surgery presents to ED with L chest wall pain s/p fall. Patient reports slipping and falling on 11/11; she reports immediate pain to knees and L anterior chest wall, though was able to ambulate immediately afterward. She presented to urgent care today 2/2 continued L chest wall pain and inability to sneeze; CXR was inconclusive. As she takes Eliquis for afib, it was recommended she come to ED to receive a CT.    CT demonstrated displaced, comminuted fx of the anterior aspect of the L second rib and nondisplaced fx of anterior aspects of left 3-5 ribs. No pleural effusion or pneumothorax. On exam, the patient endorses pain of the L anterior chest wall. She rates pain as 6/10, pulls 1000cc on IS, and has a weak cough (PIC score 6).  Awake alert  Bilateral BS, splinting L chest  hemodynamic intact  Abdomen soft  Neurologic intact  Patient to be admitted, may need intercostal block and pain regimen to be adjusted  May continue Eliquis

## 2022-11-15 NOTE — H&P ADULT - HISTORY OF PRESENT ILLNESS
62F with PMH afib on Eliquis, HTN, dyslipidemia, hypothyroidism, obesity, osteopenia, GERD and PSH right rotator cuff repair, appendectomy, bunionectomy, right wrist surgery presents to ED with L chest wall pain s/p fall. Patient reports slipping and falling on 11/11; she reports immediate pain to knees and L anterior chest wall, though was able to ambulate immediately afterward. She presented to urgent care today 2/2 continued L chest wall pain and inability to sneeze; CXR was inconclusive. As she takes Eliquis for afib, it was recommended she come to ED to receive a CT.    CT demonstrated displaced, communited fx of the anterior aspect of the L second rib and nondisplaced fx of anterior aspects of left 3-5 ribs. No pleural effusion or pneumothorax. On exam, the patient endorses pain of the L anterior chest wall. She rates pain as 6/10, pulls 1000cc on IS, and has a weak cough (PIC score 6).    Primary:  A: intact  B: breath sounds present b/l  C: central pulses palpable  D: GCS 15  E: no gross hemorrhage or defect

## 2022-11-15 NOTE — ED ADULT NURSE REASSESSMENT NOTE - NS ED NURSE REASSESS COMMENT FT1
pt to be admitted , Multiple fractured left  ribs, pt using incentive spirometer, demonstrated proper use   at bedside , pt states + relief from pain med

## 2022-11-15 NOTE — H&P ADULT - NSHPPHYSICALEXAM_GEN_ALL_CORE
PHYSICAL EXAM:  GENERAL: NAD, well-developed  HEAD:  Atraumatic, Normocephalic  EYES: EOMI, PERRLA, conjunctiva and sclera clear  NECK: Supple, No JVD  CHEST/LUNG: Clear to auscultation bilaterally; No wheeze; ; TTP L anterior chest wall, IS 1000  HEART: Regular rate and rhythm; No murmurs, rubs, or gallops  ABDOMEN: Soft, Nontender, Nondistended; Bowel sounds present  EXTREMITIES:  2+ Peripheral Pulses, No clubbing, cyanosis, or edema; ; ecchymosis L hip, mild TTP, stable to rock; no C/T/L-spine tenderness or step-offs  PSYCH: AAOx3  NEUROLOGY: non-focal  SKIN: No rashes or lesions

## 2022-11-15 NOTE — H&P ADULT - ASSESSMENT
62F presents to ED 4 days s/p GLF, found to have 4 consecutive rib fractures. PIC score is 6 on admission, found to have no pneumothorax or hemothorax on admission.    Plan:  -admit to trauma - Dr. العراقي  -pain control, will consult pain management  -home meds  -regular diet  -DVT ppx (home eliquis)  -IS/aggressive pulm toilet    Discussed with attending physician, Dr. العراقي, who agrees.

## 2022-11-16 ENCOUNTER — TRANSCRIPTION ENCOUNTER (OUTPATIENT)
Age: 62
End: 2022-11-16

## 2022-11-16 LAB
HCV AB S/CO SERPL IA: 0.1 S/CO — SIGNIFICANT CHANGE UP (ref 0–0.99)
HCV AB SERPL-IMP: SIGNIFICANT CHANGE UP

## 2022-11-16 PROCEDURE — 71045 X-RAY EXAM CHEST 1 VIEW: CPT | Mod: 26

## 2022-11-16 RX ORDER — APIXABAN 2.5 MG/1
5 TABLET, FILM COATED ORAL EVERY 12 HOURS
Refills: 0 | Status: DISCONTINUED | OUTPATIENT
Start: 2022-11-17 | End: 2022-11-17

## 2022-11-16 RX ORDER — SENNA PLUS 8.6 MG/1
2 TABLET ORAL AT BEDTIME
Refills: 0 | Status: DISCONTINUED | OUTPATIENT
Start: 2022-11-16 | End: 2022-11-17

## 2022-11-16 RX ORDER — IBUPROFEN 200 MG
600 TABLET ORAL EVERY 6 HOURS
Refills: 0 | Status: DISCONTINUED | OUTPATIENT
Start: 2022-11-16 | End: 2022-11-17

## 2022-11-16 RX ADMIN — LIDOCAINE 2 PATCH: 4 CREAM TOPICAL at 19:22

## 2022-11-16 RX ADMIN — OXYCODONE HYDROCHLORIDE 5 MILLIGRAM(S): 5 TABLET ORAL at 11:07

## 2022-11-16 RX ADMIN — LIDOCAINE 2 PATCH: 4 CREAM TOPICAL at 17:39

## 2022-11-16 RX ADMIN — OXYCODONE HYDROCHLORIDE 5 MILLIGRAM(S): 5 TABLET ORAL at 12:00

## 2022-11-16 RX ADMIN — GABAPENTIN 300 MILLIGRAM(S): 400 CAPSULE ORAL at 21:40

## 2022-11-16 RX ADMIN — METHOCARBAMOL 750 MILLIGRAM(S): 500 TABLET, FILM COATED ORAL at 11:07

## 2022-11-16 RX ADMIN — Medication 15 MILLIGRAM(S): at 06:01

## 2022-11-16 RX ADMIN — OXYCODONE HYDROCHLORIDE 5 MILLIGRAM(S): 5 TABLET ORAL at 23:31

## 2022-11-16 RX ADMIN — METHOCARBAMOL 750 MILLIGRAM(S): 500 TABLET, FILM COATED ORAL at 06:01

## 2022-11-16 RX ADMIN — ATORVASTATIN CALCIUM 40 MILLIGRAM(S): 80 TABLET, FILM COATED ORAL at 21:40

## 2022-11-16 RX ADMIN — PANTOPRAZOLE SODIUM 40 MILLIGRAM(S): 20 TABLET, DELAYED RELEASE ORAL at 06:01

## 2022-11-16 RX ADMIN — GABAPENTIN 300 MILLIGRAM(S): 400 CAPSULE ORAL at 13:43

## 2022-11-16 RX ADMIN — METHOCARBAMOL 750 MILLIGRAM(S): 500 TABLET, FILM COATED ORAL at 23:31

## 2022-11-16 RX ADMIN — Medication 25 MICROGRAM(S): at 06:01

## 2022-11-16 RX ADMIN — METHOCARBAMOL 750 MILLIGRAM(S): 500 TABLET, FILM COATED ORAL at 17:37

## 2022-11-16 RX ADMIN — LIDOCAINE 2 PATCH: 4 CREAM TOPICAL at 04:33

## 2022-11-16 RX ADMIN — SENNA PLUS 2 TABLET(S): 8.6 TABLET ORAL at 21:40

## 2022-11-16 RX ADMIN — GABAPENTIN 300 MILLIGRAM(S): 400 CAPSULE ORAL at 06:02

## 2022-11-16 NOTE — CONSULT NOTE ADULT - ASSESSMENT
62F  CT demonstrated displaced, communited fx of the anterior aspect of the L second rib and nondisplaced fx of anterior aspects of left 3-5 ribs.    Patient interested in a nerve block procedure as an addition to their current analgesic therapy.   The patient has not experienced satisfactory relief from other treatment modalities including analgesic pain medications.  The risks, benefits and alternatives of a nerve block were discussed with the patient.  The benefits include hopeful relief of pain.  The alternatives include avoiding the procedure and continuing treatment with non-medication therapies and medications, including increasing and/or changing current analgesic medications.  The patient understands that this is an invasive procedure and therefore there are inherent risks. The patient was informed of the risks of the procedure including but not limited to infection, bleeding, injury to nearby tissues, permanent nerve injury, stroke, no decrease in pain or worsened pain, and toxicity from local anesthetic medications.   No certain guarantees have been made and patient understands that responses can vary and repeat procedures may be necessary.

## 2022-11-16 NOTE — CONSULT NOTE ADULT - SUBJECTIVE AND OBJECTIVE BOX
Patient is a 62y old  Female who presents with a chief complaint of Rib fractures s/p ground level fall (16 Nov 2022 05:07)      HPI:  62F with PMH afib on Eliquis, HTN, dyslipidemia, hypothyroidism, obesity, osteopenia, GERD and PSH right rotator cuff repair, appendectomy, bunionectomy, right wrist surgery presents to ED with L chest wall pain s/p fall. Patient reports slipping and falling on 11/11; she reports immediate pain to knees and L anterior chest wall, though was able to ambulate immediately afterward. She presented to urgent care today 2/2 continued L chest wall pain and inability to sneeze; CXR was inconclusive. As she takes Eliquis for afib, it was recommended she come to ED to receive a CT.    CT demonstrated displaced, communited fx of the anterior aspect of the L second rib and nondisplaced fx of anterior aspects of left 3-5 ribs. No pleural effusion or pneumothorax. On exam, the patient endorses pain of the L anterior chest wall. She rates pain as 6/10, pulls 1000cc on IS, and has a weak cough (PIC score 6).    Primary:  A: intact  B: breath sounds present b/l  C: central pulses palpable  D: GCS 15  E: no gross hemorrhage or defect (15 Nov 2022 14:57)        Interval Hx:  Patient seen during rounds  Patient reports pain to be mod controlled on current medications  Patient denies sedation with medications   Discussed nerve block procedure as an addition to their current analgesic therapy.         Analgesic Dosing for past 24 hours reviewed as below:    gabapentin   300 milliGRAM(s) Oral (11-16-22 @ 13:43)   300 milliGRAM(s) Oral (11-16-22 @ 06:02)   300 milliGRAM(s) Oral (11-15-22 @ 21:03)    ketorolac   Injectable   15 milliGRAM(s) IV Push (11-16-22 @ 06:01)   15 milliGRAM(s) IV Push (11-15-22 @ 18:21)    methocarbamol   750 milliGRAM(s) Oral (11-16-22 @ 11:07)   750 milliGRAM(s) Oral (11-16-22 @ 06:01)   750 milliGRAM(s) Oral (11-15-22 @ 21:08)   750 milliGRAM(s) Oral (11-15-22 @ 18:20)    oxyCODONE    IR   5 milliGRAM(s) Oral (11-16-22 @ 11:07)   5 milliGRAM(s) Oral (11-15-22 @ 21:04)   5 milliGRAM(s) Oral (11-15-22 @ 16:29)          T(C): 36.7 (11-16-22 @ 11:24), Max: 36.7 (11-16-22 @ 00:41)  HR: 51 (11-16-22 @ 11:24) (44 - 56)  BP: 139/79 (11-16-22 @ 11:24) (119/72 - 139/79)  RR: 18 (11-16-22 @ 11:24) (18 - 18)  SpO2: 96% (11-16-22 @ 11:24) (96% - 96%)        acetaminophen     Tablet .. 650 milliGRAM(s) Oral every 6 hours PRN  atorvastatin 40 milliGRAM(s) Oral at bedtime  gabapentin 300 milliGRAM(s) Oral every 8 hours  ibuprofen  Tablet. 600 milliGRAM(s) Oral every 6 hours PRN  levothyroxine 25 MICROGram(s) Oral daily  lidocaine   4% Patch 2 Patch Transdermal every 24 hours  methocarbamol 750 milliGRAM(s) Oral every 6 hours  oxyCODONE    IR 5 milliGRAM(s) Oral every 4 hours PRN  pantoprazole    Tablet 40 milliGRAM(s) Oral before breakfast  senna 2 Tablet(s) Oral at bedtime                          13.4   7.02  )-----------( 232      ( 15 Nov 2022 16:20 )             40.0     11-15    140  |  102  |  13.0  ----------------------------<  80  4.1   |  28.0  |  0.47<L>    Ca    9.2      15 Nov 2022 16:20  Phos  3.3     11-15  Mg     2.0     11-15            Pain Service   [phone]

## 2022-11-16 NOTE — PATIENT PROFILE ADULT - FALL HARM RISK - HARM RISK INTERVENTIONS

## 2022-11-16 NOTE — DISCHARGE NOTE PROVIDER - NSDCFUSCHEDAPPT_GEN_ALL_CORE_FT
Toro Dowell  [facility]  ELECTROPH [address]  Scheduled Appointment: 01/09/2023

## 2022-11-16 NOTE — PATIENT PROFILE ADULT - FALL HARM RISK - CONCLUSION
Right Lower Extremity: Examination of the right lower extremity does not show any tenderness, deformity or injury. Range of motion is unremarkable. There is no gross instability. There are no rashes, ulcerations or lesions. Strength and tone are normal.    Radiology:     X-rays 2 views left hip are normal in appearance       Assessment : Left hip pain secondary to greater trochanteric bursitis    Impression:  Encounter Diagnoses   Name Primary?  It band syndrome, left     Primary osteoarthritis of left hip Yes       Office Procedures:  No orders of the defined types were placed in this encounter. Treatment Plan: Cortisone injection left hip but observation. I give her some Tylenol 3 for the acuteness of her pain and injection. After informed consent was received from the patient, the left hip was sterilely prepped using Betadine with the patient in a lateral decubitusposition then using ethyl chloride as a topical refrigerant andan injection using 1 mL of 6mg/ml Bethamethasone, 2 mL each of 1% Xylocaine and 0.5%Marcaine in a 5 mL syringe and a 25-gauge spinal needle was used to inject the medication into the greater trochanteric region and to the point of maximum  tenderness. The patient otherwise appeared to tolerate the injection well. Encounter Diagnoses   Name Primary?  It band syndrome, left     Greater trochanteric bursitis of left hip Yes    Acute hip pain, left       No orders of the defined types were placed in this encounter. Fall with Harm Risk

## 2022-11-16 NOTE — DISCHARGE NOTE PROVIDER - HOSPITAL COURSE
Admission HPI: 62F with PMH afib on Eliquis, HTN, dyslipidemia, hypothyroidism, obesity, osteopenia, GERD and PSH right rotator cuff repair, appendectomy, bunionectomy, right wrist surgery presents to ED with L chest wall pain s/p fall. Patient reports slipping and falling on 11/11; she reports immediate pain to knees and L anterior chest wall, though was able to ambulate immediately afterward. She presented to urgent care today 2/2 continued L chest wall pain and inability to sneeze; CXR was inconclusive. As she takes Eliquis for afib, it was recommended she come to ED to receive a CT.    CT demonstrated displaced, comminuted fx of the anterior aspect of the L second rib and nondisplaced fx of anterior aspects of left 3-5 ribs. No pleural effusion or pneumothorax. On exam, the patient endorses pain of the L anterior chest wall. She rates pain as 6/10, pulls 1000cc on IS, and has a weak cough (PIC score 6).    Hospital Course: Patient was admitted to the trauma service. Placed on PIC protocol w/ emphasis on multimodal analgesia & aggressive pulm toiletting. Pain mgmt was consulted & Dr. Ba performed rib block on 11/16 which pt tolerated well. Repeat CXR showed no delayed hemothorax. Currently her pain is adequately controlled on PO medications, OOB ambulating, tolerating diet, and voiding. Stable for discharge with outpatient follow-up.    Patient is advised to RETURN TO THE EMERGENCY DEPARTMENT for any of the following - worsening pain, fever/chills, nausea/vomiting, altered mental status, chest pain, shortness of breath, or any other new / worsening symptom.

## 2022-11-16 NOTE — PROCEDURE NOTE - ADDITIONAL PROCEDURE DETAILS
Using the ultrasound probe to count ribs starting from the 12th rib, the 4th rib was located at the mid axillary line. After sterile prep and gloves were don, a 22G 50mm Pajunk needle was advanced in-plane and 12cc of Exparel + 12cc of Bupivacaine 0.25% was administered into the superficial and deep SAP in divided doses. Aspiration q5ccs was negative for heme/csf/air.   Local anesthetic spread was visualized on ultrasound.  Meaningful patient verbal response maintained throughout procedure.  Block needle tip identified by ultrasound throughout the procedure.

## 2022-11-16 NOTE — CHART NOTE - NSCHARTNOTEFT_GEN_A_CORE
Tertiary Trauma Survey (TTS)    Date of TTS: 11/16/22               Time: 13:30  Admit Date: 11/15/22                Trauma Activation: 14:57  Admit GCS: E4     V5     M6     HPI:  62F with PMH afib on Eliquis, HTN, dyslipidemia, hypothyroidism, obesity, osteopenia, GERD and PSH right rotator cuff repair, appendectomy, bunionectomy, right wrist surgery presents to ED with L chest wall pain s/p fall. Patient reports slipping and falling on 11/11; she reports immediate pain to knees and L anterior chest wall, though was able to ambulate immediately afterward. She presented to urgent care today 2/2 continued L chest wall pain and inability to sneeze; CXR was inconclusive. As she takes Eliquis for afib, it was recommended she come to ED to receive a CT.    CT demonstrated displaced, comminuted fx of the anterior aspect of the L second rib and nondisplaced fx of anterior aspects of left 3-5 ribs. No pleural effusion or pneumothorax. She has received pain control per protocol and has now received a rib block from Pain Management. She rates her pain at rest as a 5/10, has a strong cough, and pulls 2000cc on incentive spirometry. She reports feeling significantly more comfortable and hopes to go home soon.      PAST MEDICAL & SURGICAL HISTORY:  Hypothyroidism      Hypertension      Obesity      Osteopenia      Dyslipidemia      GERD (gastroesophageal reflux disease)  hernia repair 2019 GERD resolved      History of appendectomy  1991      Status post wrist surgery  right;  2003      H/O repair of right rotator cuff  2002      History of bunionectomy  right; 2006      Hiatal hernia with GERD  S/P HIATAL HERNIA REPAIR 2019  &quot; Endoscopic lesion of stomach&quot;; 03/05/19        [  ] No significant past history as reviewed with the patient and family    FAMILY HISTORY:    [  ] Family history not pertinent as reviewed with the patient and family    SOCIAL HISTORY:    Medications (inpatient): atorvastatin 40 milliGRAM(s) Oral at bedtime  gabapentin 300 milliGRAM(s) Oral every 8 hours  levothyroxine 25 MICROGram(s) Oral daily  lidocaine   4% Patch 2 Patch Transdermal every 24 hours  methocarbamol 750 milliGRAM(s) Oral every 6 hours  pantoprazole    Tablet 40 milliGRAM(s) Oral before breakfast  senna 2 Tablet(s) Oral at bedtime    Medications (PRN):acetaminophen     Tablet .. 650 milliGRAM(s) Oral every 6 hours PRN  ibuprofen  Tablet. 600 milliGRAM(s) Oral every 6 hours PRN  oxyCODONE    IR 5 milliGRAM(s) Oral every 4 hours PRN    Allergies: No Known Allergies  (Intolerances: )    Vital Signs Last 24 Hrs  T(C): 36.7 (16 Nov 2022 11:24), Max: 36.7 (16 Nov 2022 00:41)  T(F): 98 (16 Nov 2022 11:24), Max: 98.1 (16 Nov 2022 00:41)  HR: 51 (16 Nov 2022 11:24) (44 - 56)  BP: 139/79 (16 Nov 2022 11:24) (119/72 - 139/79)  BP(mean): --  RR: 18 (16 Nov 2022 11:24) (18 - 18)  SpO2: 96% (16 Nov 2022 11:24) (96% - 96%)    Parameters below as of 16 Nov 2022 11:24  Patient On (Oxygen Delivery Method): room air      Drug Dosing Weight  Height (cm): 170.2 (30 Aug 2022 07:08)  Weight (kg): 67.1 (15 Nov 2022 10:07)  BMI (kg/m2): 23.2 (15 Nov 2022 10:07)  BSA (m2): 1.78 (15 Nov 2022 10:07)    Exam:  Head: NCAT, MMM  Eyes: normal visual acuity, EOMI  Nose: no septal hematoma  Neuro: CN 2-12 intact, normal activity, normal strength, SILT bilaterally  Neck: soft, supple  Chest: normal work of breathing, chest expansion; TTP over left chest wall at site of rib fx, w/o ecchymosis  Pulm: comfortable, no accessory muscle use  Abd: soft, nt, nd; ecchymosis to L hip, stable from prior. Pelvis stable  Back: no e/o trauma or abrasions, no midline tenderness  Ext: WWP, no edema; mild ecchymosis to R knee                          13.4   7.02  )-----------( 232      ( 15 Nov 2022 16:20 )             40.0     11-15    140  |  102  |  13.0  ----------------------------<  80  4.1   |  28.0  |  0.47<L>    Ca    9.2      15 Nov 2022 16:20  Phos  3.3     11-15  Mg     2.0     11-15            List Injuries Identified to Date:  Displaced comminuted fracture of the anterior aspect of the left second rib and nondisplaced fractures of the anterior aspects of the left third through fifth ribs.    List Operative and Interventional Radiological Procedures:     Consults (Date):  [  ] Pain management (11/16/22)    RADIOLOGICAL FINDINGS REVIEW:  CXR:  PROCEDURE DATE: 11/15/2022    INTERPRETATION: INDICATION: Chest pain    COMPARISON: 4/28/2021    FINDINGS:  An AP chest radiograph shows linear density at the left lung base, with similar findings in the right, likely due to subsegmental atelectasis. This is not seen previously. No infiltrates are seen. The remaining lungs are clear. There is no pneumothorax. There are no pleural effusions. There is no hilar or mediastinal widening. Heart size is normal, without CHF. No displaced fractures are seen in the single frontal view.    IMPRESSION:  1. Mild bilateral lower lobe linear atelectasis, or possibly scarring having evolved since the prior study of 4/28/2021.  2. No evidence of pneumonia, pleural effusion or CHF.        Head CT:  PROCEDURE DATE: 11/15/2022    INTERPRETATION: Clinical indication: Trauma    Technique:  Multiple axial sections were acquired from the base of the skull to the vertex without contrast enhancement. Coronal and sagittal reconstructed images were performed as well.    Findings:  The lateral ventricles have a normal configuration.    There is no evidence of acute hemorrhage, mass or mass-effect in the posterior fossa or in the supratentorial region.    Evaluation of the osseous structures with the appropriate window appears unremarkable.    Right sphenoid sinus mucosal thickening is seen.    Bilateral maxillary polyps versus retention cyst is seen.    Both mastoid and middle ear regions appear clear.    Impression:  Unremarkable noncontrast head CT.      Chest CT:  PROCEDURE DATE: 11/15/2022    INTERPRETATION: INDICATION: Trauma    TECHNIQUE: Volumetric images of the chest without intravenous contrast. Maximum intensity projection images were generated.    COMPARISON: CT chest 2/3/2020.    FINDINGS:    LUNGS/AIRWAYS/PLEURA: Patent trachea and bronchi. Mild dependent atelectasis in the left lower lobe. Unchanged likely 2 mm nodule in the right lower lobe (3-63). No pleural effusion.    LYMPH NODES/MEDIASTINUM: Stable subcentimeter partially calcified left thyroid nodule. No enlarged lymph nodes.    HEART/VASCULATURE: Dilated left atrium. No pericardial effusion. Unchanged dilated approximate 4.1 cm midascending aorta. Subjectively mild amount of coronary calcified plaque.    UPPER ABDOMEN: Calcified granuloma in the liver.    BONES/SOFT TISSUES: Displaced comminuted fracture of the anterior aspect of the left second rib and nondisplaced fractures of the anterior aspects of the left third through fifth ribs.      IMPRESSION:    Fractures of the left second through fifth ribs. No pleural effusion or pneumothorax.      Assessment and Plan:  62F s/p slip and fall with 4 consecutive rib fractures. Managed per rib fx pain control protocol, now s/p rib block with pain management. No new traumatic injuries identified on tertiary exam.    Plan:  -c/w pain regimen per protocol  -IS/aggressive pulm toilet  -OOB/ambulate as tolerated  -f/u repeat CXR  -dispo planning, likely tomorrow

## 2022-11-16 NOTE — DISCHARGE NOTE PROVIDER - NSDCMRMEDTOKEN_GEN_ALL_CORE_FT
Eliquis 5 mg oral tablet: 1 tab(s) orally 2 times a day  levothyroxine 25 mcg (0.025 mg) oral tablet: 1 tab(s) orally once a day  pantoprazole 40 mg oral delayed release tablet: 1 tab(s) orally 2 times a day x 14 days then decrease to once daily x 6 weeks then stop   simvastatin 20 mg oral tablet: 1 tab(s) orally once a day  sucralfate 1 g/10 mL oral suspension: 10 milliliter(s) orally 2 times a day x 14 days then stop   acetaminophen 325 mg oral tablet: 2 tab(s) orally every 6 hours, As needed, Mild Pain (1 - 3)  Eliquis 5 mg oral tablet: 1 tab(s) orally 2 times a day  ibuprofen 600 mg oral tablet: 1 tab(s) orally every 6 hours, As needed, Moderate Pain (4 - 6)  levothyroxine 25 mcg (0.025 mg) oral tablet: 1 tab(s) orally once a day  lidocaine 4% topical film: Apply topically to affected area once a day   methocarbamol 750 mg oral tablet: 1 tab(s) orally every 8 hours, As Needed -for muscle spasm   Narcan 4 mg/0.1 mL nasal spray: 4 milligram(s) intranasally once ** use in the event of opioid overdose and call 911 immediately **   oxyCODONE 5 mg oral tablet: 1 tab(s) orally every 6 hours, As Needed -for severe pain MDD:4   simvastatin 20 mg oral tablet: 1 tab(s) orally once a day

## 2022-11-16 NOTE — PROGRESS NOTE ADULT - SUBJECTIVE AND OBJECTIVE BOX
SUBJECTIVE/24 hour events:  62F with PMH afib on Eliquis, HTN, dyslipidemia, hypothyroidism, obesity, osteopenia, GERD and PSH right rotator cuff repair, appendectomy, bunionectomy, right wrist surgery presents to ED with L chest wall pain s/p fall. Patient reports slipping and falling on 11/11; she reports immediate pain to knees and L anterior chest wall, though was able to ambulate immediately afterward. She presented to urgent care today 2/2 continued L chest wall pain and inability to sneeze; CXR was inconclusive. As she takes Eliquis for afib, it was recommended she come to ED to receive a CT. Patient found to have 4 left sided rib fractures. Patient with no acute events overnight, appears clinically well, in no respiratory distress, pic score 6-7, pain well controlled         Vital Signs Last 24 Hrs  T(C): 36.7 (16 Nov 2022 00:41), Max: 36.8 (15 Nov 2022 10:07)  T(F): 98.1 (16 Nov 2022 00:41), Max: 98.3 (15 Nov 2022 10:07)  HR: 53 (16 Nov 2022 00:41) (51 - 53)  BP: 121/73 (16 Nov 2022 00:41) (121/73 - 137/78)  BP(mean): --  RR: 18 (16 Nov 2022 00:41) (18 - 18)  SpO2: 96% (16 Nov 2022 00:41) (96% - 98%)    Parameters below as of 16 Nov 2022 00:41  Patient On (Oxygen Delivery Method): room air      Drug Dosing Weight  Height (cm): 170.2 (30 Aug 2022 07:08)  Weight (kg): 67.1 (15 Nov 2022 10:07)  BMI (kg/m2): 23.2 (15 Nov 2022 10:07)  BSA (m2): 1.78 (15 Nov 2022 10:07)  I&O's Detail    Allergies    No Known Allergies    Intolerances                              13.4   7.02  )-----------( 232      ( 15 Nov 2022 16:20 )             40.0   11-15    140  |  102  |  13.0  ----------------------------<  80  4.1   |  28.0  |  0.47<L>    Ca    9.2      15 Nov 2022 16:20  Phos  3.3     11-15  Mg     2.0     11-15        ROS:    PHYSICAL EXAM:  GENERAL: NAD, well-developed  HEAD:  Atraumatic, Normocephalic  EYES: EOMI, PERRLA, conjunctiva and sclera clear  NECK: Supple, No JVD  CHEST/LUNG: Clear to auscultation bilaterally; No wheeze; ; TTP L anterior chest wall, IS 1000  HEART: Regular rate and rhythm; No murmurs, rubs, or gallops  ABDOMEN: Soft, Nontender, Nondistended; Bowel sounds present  EXTREMITIES:  2+ Peripheral Pulses, No clubbing, cyanosis, or edema; ; ecchymosis L hip, mild TTP, stable to rock; no C/T/L-spine tenderness or step-offs  PSYCH: AAOx3  NEUROLOGY: non-focal  SKIN: No rashes or lesion      MEDICATIONS  (STANDING):  atorvastatin 40 milliGRAM(s) Oral at bedtime  gabapentin 300 milliGRAM(s) Oral every 8 hours  ketorolac   Injectable 15 milliGRAM(s) IV Push every 6 hours  levothyroxine 25 MICROGram(s) Oral daily  lidocaine   4% Patch 2 Patch Transdermal every 24 hours  methocarbamol 750 milliGRAM(s) Oral every 6 hours  pantoprazole    Tablet 40 milliGRAM(s) Oral before breakfast    MEDICATIONS  (PRN):  acetaminophen     Tablet .. 650 milliGRAM(s) Oral every 6 hours PRN Mild Pain (1 - 3)  oxyCODONE    IR 5 milliGRAM(s) Oral every 4 hours PRN Severe Pain (7 - 10)      RADIOLOGY STUDIES:    CULTURES:

## 2022-11-16 NOTE — DISCHARGE NOTE PROVIDER - NSFOLLOWUPCLINICS_GEN_ALL_ED_FT
[facility] Acute Care Surgery  Acute Care Surgery  [address] [place] [id number]  Phone: [phone]  Fax:

## 2022-11-16 NOTE — PROGRESS NOTE ADULT - ASSESSMENT
62F presents to ED 4 days s/p GLF, found to have 4 consecutive rib fractures. PIC score is 6 on admission, found to have no pneumothorax or hemothorax on admission.    Plan:  - pic score  -pain control, will consult pain management  -home meds  -regular diet  -DVT ppx (home eliquis)  -IS/aggressive pulm toilet

## 2022-11-17 ENCOUNTER — TRANSCRIPTION ENCOUNTER (OUTPATIENT)
Age: 62
End: 2022-11-17

## 2022-11-17 VITALS
DIASTOLIC BLOOD PRESSURE: 82 MMHG | RESPIRATION RATE: 18 BRPM | HEART RATE: 45 BPM | SYSTOLIC BLOOD PRESSURE: 131 MMHG | OXYGEN SATURATION: 96 % | TEMPERATURE: 98 F

## 2022-11-17 PROCEDURE — 71045 X-RAY EXAM CHEST 1 VIEW: CPT

## 2022-11-17 PROCEDURE — 0225U NFCT DS DNA&RNA 21 SARSCOV2: CPT

## 2022-11-17 PROCEDURE — 84100 ASSAY OF PHOSPHORUS: CPT

## 2022-11-17 PROCEDURE — 99232 SBSQ HOSP IP/OBS MODERATE 35: CPT

## 2022-11-17 PROCEDURE — 85027 COMPLETE CBC AUTOMATED: CPT

## 2022-11-17 PROCEDURE — G1004: CPT

## 2022-11-17 PROCEDURE — 70450 CT HEAD/BRAIN W/O DYE: CPT | Mod: ME

## 2022-11-17 PROCEDURE — 83735 ASSAY OF MAGNESIUM: CPT

## 2022-11-17 PROCEDURE — 80048 BASIC METABOLIC PNL TOTAL CA: CPT

## 2022-11-17 PROCEDURE — 99285 EMERGENCY DEPT VISIT HI MDM: CPT

## 2022-11-17 PROCEDURE — 71250 CT THORAX DX C-: CPT | Mod: MF

## 2022-11-17 PROCEDURE — 86803 HEPATITIS C AB TEST: CPT

## 2022-11-17 PROCEDURE — 36415 COLL VENOUS BLD VENIPUNCTURE: CPT

## 2022-11-17 RX ORDER — LIDOCAINE 4 G/100G
2 CREAM TOPICAL
Qty: 14 | Refills: 0
Start: 2022-11-17 | End: 2022-11-23

## 2022-11-17 RX ORDER — NALOXONE HYDROCHLORIDE 4 MG/.1ML
4 SPRAY NASAL
Qty: 1 | Refills: 0
Start: 2022-11-17

## 2022-11-17 RX ORDER — METHOCARBAMOL 500 MG/1
1 TABLET, FILM COATED ORAL
Qty: 15 | Refills: 0
Start: 2022-11-17 | End: 2022-11-21

## 2022-11-17 RX ORDER — IBUPROFEN 200 MG
1 TABLET ORAL
Qty: 0 | Refills: 0 | DISCHARGE
Start: 2022-11-17

## 2022-11-17 RX ORDER — ACETAMINOPHEN 500 MG
2 TABLET ORAL
Qty: 0 | Refills: 0 | DISCHARGE
Start: 2022-11-17

## 2022-11-17 RX ORDER — OXYCODONE HYDROCHLORIDE 5 MG/1
1 TABLET ORAL
Qty: 12 | Refills: 0
Start: 2022-11-17 | End: 2022-11-19

## 2022-11-17 RX ADMIN — METHOCARBAMOL 750 MILLIGRAM(S): 500 TABLET, FILM COATED ORAL at 11:29

## 2022-11-17 RX ADMIN — APIXABAN 5 MILLIGRAM(S): 2.5 TABLET, FILM COATED ORAL at 05:19

## 2022-11-17 RX ADMIN — GABAPENTIN 300 MILLIGRAM(S): 400 CAPSULE ORAL at 05:19

## 2022-11-17 RX ADMIN — METHOCARBAMOL 750 MILLIGRAM(S): 500 TABLET, FILM COATED ORAL at 05:19

## 2022-11-17 RX ADMIN — Medication 25 MICROGRAM(S): at 05:19

## 2022-11-17 RX ADMIN — PANTOPRAZOLE SODIUM 40 MILLIGRAM(S): 20 TABLET, DELAYED RELEASE ORAL at 05:19

## 2022-11-17 RX ADMIN — OXYCODONE HYDROCHLORIDE 5 MILLIGRAM(S): 5 TABLET ORAL at 00:31

## 2022-11-17 RX ADMIN — GABAPENTIN 300 MILLIGRAM(S): 400 CAPSULE ORAL at 13:07

## 2022-11-17 RX ADMIN — LIDOCAINE 2 PATCH: 4 CREAM TOPICAL at 05:19

## 2022-11-17 NOTE — PROGRESS NOTE ADULT - SUBJECTIVE AND OBJECTIVE BOX
INTERVAL HPI/OVERNIGHT EVENTS:    Patient evaluated at bedside. NAOE. Patient denies N/V/CP/SOB, no subjective fevers or chills. Pain well controlled. Received rib block with pain management yesterday, which she has found very helpful for overall pain control. PIC score of 9 this AM.    MEDICATIONS  (STANDING):  apixaban 5 milliGRAM(s) Oral every 12 hours  atorvastatin 40 milliGRAM(s) Oral at bedtime  gabapentin 300 milliGRAM(s) Oral every 8 hours  levothyroxine 25 MICROGram(s) Oral daily  lidocaine   4% Patch 2 Patch Transdermal every 24 hours  methocarbamol 750 milliGRAM(s) Oral every 6 hours  pantoprazole    Tablet 40 milliGRAM(s) Oral before breakfast  senna 2 Tablet(s) Oral at bedtime    MEDICATIONS  (PRN):  acetaminophen     Tablet .. 650 milliGRAM(s) Oral every 6 hours PRN Mild Pain (1 - 3)  ibuprofen  Tablet. 600 milliGRAM(s) Oral every 6 hours PRN Moderate Pain (4 - 6)  oxyCODONE    IR 5 milliGRAM(s) Oral every 4 hours PRN Severe Pain (7 - 10)      Vital Signs Last 24 Hrs  T(C): 36.4 (17 Nov 2022 04:00), Max: 37.2 (16 Nov 2022 19:36)  T(F): 97.5 (17 Nov 2022 04:00), Max: 99 (16 Nov 2022 19:36)  HR: 47 (17 Nov 2022 04:00) (47 - 56)  BP: 126/75 (17 Nov 2022 04:00) (126/75 - 152/73)  BP(mean): --  RR: 20 (17 Nov 2022 04:00) (18 - 20)  SpO2: 94% (17 Nov 2022 04:00) (94% - 97%)    Parameters below as of 17 Nov 2022 04:00  Patient On (Oxygen Delivery Method): room air    PHYSICAL EXAM:  GENERAL: NAD, well-developed  HEAD:  Atraumatic, Normocephalic  EYES: EOMI, PERRLA, conjunctiva and sclera clear  NECK: Supple, No JVD  CHEST/LUNG: Respirations even, unlabored on room air; TTP L anterior chest wall, IS 2000  HEART: RRR  ABDOMEN: Soft, Nontender, Nondistended; Bowel sounds present  EXTREMITIES:  2+ Peripheral Pulses, No clubbing, cyanosis, or edema; ecchymosis L hip, mild TTP, stable to rock; no C/T/L-spine tenderness or step-offs  PSYCH: AAOx3  NEUROLOGY: non-focal  SKIN: No rashes or lesion      I&O's Detail      LABS:                        13.4   7.02  )-----------( 232      ( 15 Nov 2022 16:20 )             40.0     11-15    140  |  102  |  13.0  ----------------------------<  80  4.1   |  28.0  |  0.47<L>    Ca    9.2      15 Nov 2022 16:20  Phos  3.3     11-15  Mg     2.0     11-15

## 2022-11-17 NOTE — PROGRESS NOTE ADULT - ASSESSMENT
62F presents to ED 4 days s/p GLF, found to have 4 consecutive rib fractures. PIC score is 6 on admission, found to have no pneumothorax or hemothorax on admission. Now with pain well controlled, PIC score 9. Appropriate for DC to home.    Plan:  -PRN pain control  -home meds  -regular diet  -DVT ppx (home eliquis)  -IS/aggressive pulm toilet  -likely home today

## 2022-11-17 NOTE — DISCHARGE NOTE NURSING/CASE MANAGEMENT/SOCIAL WORK - PATIENT PORTAL LINK FT
You can access the FollowMyHealth Patient Portal offered by [facility] by registering at the following website: [email or web address]. By joining [employer]’s FollowMyHealth portal, you will also be able to view your health information using other applications (apps) compatible with our system.

## 2022-11-17 NOTE — DISCHARGE NOTE NURSING/CASE MANAGEMENT/SOCIAL WORK - NSDCPEFALRISK_GEN_ALL_CORE
For information on Fall & Injury Prevention, visit: [email or web address] OR  [email or web address] OR  https://www.cdc.gov/steadi/patient.html

## 2022-11-17 NOTE — PROGRESS NOTE ADULT - ATTENDING COMMENTS
Awake alert  Hemodynamic intact  Bilateral BS, some splinting chest  Abdomen soft  neurologic intact  pain control and therapy
Comfortably sitting in bed tolerating regular diet.   Reports pain better controlled s/p rib block (+MMPR).   Demonstrated ~1,500 on IS. Good cough. PIC 6-7.     AVSS.     *Ok to dc with MMPR and IS.   *Explained need to continue IS usage, activity as tolerated. To present to ER if worsening/uncontrolled pain, respiratory distress, fevers, chills.

## 2023-01-09 ENCOUNTER — APPOINTMENT (OUTPATIENT)
Dept: ELECTROPHYSIOLOGY | Facility: CLINIC | Age: 63
End: 2023-01-09
Payer: COMMERCIAL

## 2023-01-30 ENCOUNTER — NON-APPOINTMENT (OUTPATIENT)
Age: 63
End: 2023-01-30

## 2023-01-30 ENCOUNTER — APPOINTMENT (OUTPATIENT)
Dept: ELECTROPHYSIOLOGY | Facility: CLINIC | Age: 63
End: 2023-01-30
Payer: COMMERCIAL

## 2023-01-30 VITALS
RESPIRATION RATE: 63 BRPM | BODY MASS INDEX: 23.63 KG/M2 | OXYGEN SATURATION: 97 % | TEMPERATURE: 98.1 F | DIASTOLIC BLOOD PRESSURE: 74 MMHG | WEIGHT: 147 LBS | SYSTOLIC BLOOD PRESSURE: 123 MMHG | HEIGHT: 66 IN

## 2023-01-30 PROCEDURE — 93000 ELECTROCARDIOGRAM COMPLETE: CPT

## 2023-01-30 PROCEDURE — 99214 OFFICE O/P EST MOD 30 MIN: CPT | Mod: 25

## 2023-01-30 RX ORDER — SIMVASTATIN 20 MG/1
20 TABLET, FILM COATED ORAL DAILY
Refills: 0 | Status: ACTIVE | COMMUNITY

## 2023-01-30 RX ORDER — ASPIRIN 81 MG
81 TABLET, DELAYED RELEASE (ENTERIC COATED) ORAL DAILY
Refills: 0 | Status: ACTIVE | COMMUNITY

## 2023-01-30 RX ORDER — OXYCODONE 5 MG/1
5 TABLET ORAL
Qty: 12 | Refills: 0 | Status: DISCONTINUED | COMMUNITY
Start: 2022-11-17

## 2023-01-30 RX ORDER — SUCRALFATE 1 G/10ML
1 SUSPENSION ORAL
Qty: 300 | Refills: 0 | Status: DISCONTINUED | COMMUNITY
Start: 2022-08-30

## 2023-01-30 RX ORDER — LEVOTHYROXINE SODIUM 0.03 MG/1
25 TABLET ORAL DAILY
Refills: 0 | Status: ACTIVE | COMMUNITY

## 2023-01-30 RX ORDER — NALOXONE HYDROCHLORIDE 4 MG/.1ML
4 SPRAY NASAL
Qty: 2 | Refills: 0 | Status: DISCONTINUED | COMMUNITY
Start: 2022-11-17

## 2023-01-30 RX ORDER — APIXABAN 5 MG/1
5 TABLET, FILM COATED ORAL
Qty: 180 | Refills: 0 | Status: DISCONTINUED | COMMUNITY
End: 2023-01-30

## 2023-01-30 RX ORDER — METHOCARBAMOL 750 MG/1
750 TABLET, FILM COATED ORAL
Qty: 15 | Refills: 0 | Status: DISCONTINUED | COMMUNITY
Start: 2022-11-17

## 2023-01-30 RX ORDER — BENZONATATE 200 MG/1
200 CAPSULE ORAL
Qty: 20 | Refills: 0 | Status: DISCONTINUED | COMMUNITY
Start: 2023-01-19

## 2023-01-30 RX ORDER — PANTOPRAZOLE 40 MG/1
40 TABLET, DELAYED RELEASE ORAL
Qty: 70 | Refills: 0 | Status: DISCONTINUED | COMMUNITY
Start: 2022-08-30 | End: 2023-01-30

## 2023-01-30 NOTE — REVIEW OF SYSTEMS
[Fever] : no fever [Chills] : no chills [Feeling Fatigued] : not feeling fatigued [SOB] : no shortness of breath [Leg Claudication] : no intermittent leg claudication [Palpitations] : no palpitations [Orthopnea] : no orthopnea [Syncope] : no syncope [Cough] : no cough [Wheezing] : no wheezing [Nausea] : no nausea [Vomiting] : no vomiting [Dizziness] : no dizziness

## 2023-01-30 NOTE — ASSESSMENT
[FreeTextEntry1] : Symptomatic paroxysmal AFIB, s/p ablation (RFA PVI on 8/30/22) with no symptomatic recurrence. Her CHADS-VASc score is 1-2 ( for gender and ? HTN). The diagnosis of HTN is not confirmed, she reports occasional high BP but not on meds).  She is using ECG -capable Apple watch that did not show any AF alerts. Had one week MCOT on 1/2023 which again showed no AF. \par - After counseling, we agreed with changing eliquis to aspirin 81 mg daily. Has concerns about the copays of eliquis. \par - One week MCOT in 3 months and follow up in 4 months. \par - Advised her to measure her BP frequently and discuss this with her cardiologist. \par \par \par Currently asymptomatic from SB, will monitor using the periodic MCOT and her Apple watch.\par \par Follow up in 4 months or sooner if needed.

## 2023-01-30 NOTE — HISTORY OF PRESENT ILLNESS
[FreeTextEntry1] : This is a 62 year old Female with PMH of prior HTN (off medication now), HLD, hypothyroidism, sinus bradycardia and symptomatic paroxysmal atrial fibrillation. She presented to [facility] on 2021 with near syncope and was found to be bradycardic (40s) and hypotensive (60s/40s). Her ARB was discontinued. She followed up with Dr. Jama at [facility] and had a stress test which showed good response to exercise; negative for ischemia. A one month cardiac monitor revealed new onset AF (longest episode 93 min and overall burden 0.2%). She was referred to me in the [facility] office for further management and started on ASA ([id number] =1). In 2022, she felt sudden very bothersome palpitations lasting ~ 5 hours which correlated with AF w/ RVR. After deliberation, she underwent an AF ablation with me on 22 (RFA-PVI). \par \par 10/3/2022:\par She is here for follow up. She was not given any prior follow up. She took all medications as instructed and is still on eliquis. She had few brief (10-20 seconds) of chest tightness in the first month after ablation which resolved. Denies any groin bleeding or swelling. She denies any fevers, chills, cough, sweats, chest pain, palpitations, dyspnea on exertion, orthopnea, paroxysmal nocturnal dyspnea, peripheral edema, lightheadedness, dizziness, syncope, nausea, vomiting, melena, hematochezia, or hematemesis.\par \par 2023:\par Since last visit, she remained on eliquis and had one week MCOT earlier this month which showed SR with no AF or prolonged bradycardia or symptoms. She denies any fevers, chills, cough, sweats, chest pain, palpitations, dyspnea on exertion, orthopnea, paroxysmal nocturnal dyspnea, peripheral edema, lightheadedness, dizziness, syncope, nausea, vomiting, melena, hematochezia, or hematemesis. She stopped taking eliquis last week and has been taking aspirin now. No AF alerts on her Apple watch which she uses daily. \par \par Social history:\par Negative for smoking, illicit drugs or alcohol abuse.\par \par Family history:\par Father  in a park without clear cause. He was alcoholic. Negative for premature CAD or SCD. \par \par TESTS: \par EKG 2023: SB @ 56 bpm, with old inferior Q waves\par EKG 10/3/22: Sinus  Bradycardia. Poor R wave progression. \par EK2022; Sinus bradycardia \par \par MCOT 2023: SR with average HR 60 (). No symptoms or AF or significant bradycardia. \par Echo 2021; Summary:\par  1. Left ventricular ejection fraction, by visual estimation, is 65 to 70%.\par  2. Normal global left ventricular systolic function.\par  3. Diastolic function indeterminate.\par  4. Mildly enlarged right ventricle with normal systolic function, inadequate estimation of RVSP.\par  5. Mildly enlarged left atrium.\par  6. Mild mitral valve regurgitation.\par  7. Mild tricuspid regurgitation.\par  8.Mobile intra-atrial septum.\par  9. Trivial pericardial effusion.\par \par \par STRESS TEST 2021 IMPRESSIONS:\par Exercise capacity: 8 METS, Average for age and gender. 85%of MPHR.\par Chest Pain: No chest pain with exercise.\par Symptom: Shortness of breath.\par HR Response: Appropriate.\par BP Response: Appropriate.\par Heart Rhythm: Sinus Bradycardia - 55 BPM.\par ECG Abnormalities: There were no diagnostic changes.\par Arrhythmia: Occasional APC's.\par Rivas Treadmill Score: 8

## 2023-04-18 ENCOUNTER — OUTPATIENT (OUTPATIENT)
Dept: OUTPATIENT SERVICES | Facility: HOSPITAL | Age: 63
LOS: 1 days | End: 2023-04-18
Payer: COMMERCIAL

## 2023-04-18 DIAGNOSIS — R01.1 CARDIAC MURMUR, UNSPECIFIED: ICD-10-CM

## 2023-04-18 DIAGNOSIS — Z98.890 OTHER SPECIFIED POSTPROCEDURAL STATES: Chronic | ICD-10-CM

## 2023-04-18 DIAGNOSIS — Z90.49 ACQUIRED ABSENCE OF OTHER SPECIFIED PARTS OF DIGESTIVE TRACT: Chronic | ICD-10-CM

## 2023-04-18 DIAGNOSIS — K21.9 GASTRO-ESOPHAGEAL REFLUX DISEASE WITHOUT ESOPHAGITIS: Chronic | ICD-10-CM

## 2023-04-18 PROCEDURE — 93306 TTE W/DOPPLER COMPLETE: CPT | Mod: 26

## 2023-04-18 PROCEDURE — C8929: CPT

## 2023-04-24 ENCOUNTER — APPOINTMENT (OUTPATIENT)
Dept: ELECTROPHYSIOLOGY | Facility: CLINIC | Age: 63
End: 2023-04-24
Payer: COMMERCIAL

## 2023-04-24 VITALS
BODY MASS INDEX: 25.39 KG/M2 | WEIGHT: 158 LBS | OXYGEN SATURATION: 98 % | TEMPERATURE: 98.3 F | SYSTOLIC BLOOD PRESSURE: 138 MMHG | HEIGHT: 66 IN | HEART RATE: 49 BPM | DIASTOLIC BLOOD PRESSURE: 75 MMHG

## 2023-04-24 PROCEDURE — 99214 OFFICE O/P EST MOD 30 MIN: CPT | Mod: 25

## 2023-04-24 PROCEDURE — 93000 ELECTROCARDIOGRAM COMPLETE: CPT

## 2023-04-28 NOTE — PHYSICAL EXAM
[Well Developed] : well developed [Well Nourished] : well nourished [No Acute Distress] : no acute distress [Normal Conjunctiva] : normal conjunctiva [Normal Venous Pressure] : normal venous pressure [No Carotid Bruit] : no carotid bruit [Normal S1, S2] : normal S1, S2 [No Rub] : no rub [No Murmur] : no murmur [No Gallop] : no gallop [Clear Lung Fields] : clear lung fields [Good Air Entry] : good air entry [No Respiratory Distress] : no respiratory distress  [Non Tender] : non-tender [Soft] : abdomen soft [No Masses/organomegaly] : no masses/organomegaly [Normal Bowel Sounds] : normal bowel sounds [Normal Gait] : normal gait [No Edema] : no edema [No Cyanosis] : no cyanosis [No Clubbing] : no clubbing [No Varicosities] : no varicosities [No Rash] : no rash [No Skin Lesions] : no skin lesions [Moves all extremities] : moves all extremities [No Focal Deficits] : no focal deficits [Normal Speech] : normal speech [Alert and Oriented] : alert and oriented [Normal memory] : normal memory

## 2023-04-28 NOTE — ASSESSMENT
[FreeTextEntry1] : Symptomatic paroxysmal AFIB, s/p ablation (RFA PVI on 8/30/22) with no symptomatic recurrence. Her CHADS-VASc score is 1-2 ( for gender and ? HTN). The diagnosis of HTN is not confirmed, she reports occasional high BP but not on meds).  She is using ECG -capable Apple watch that did not show any AF alerts. Had one week MCOT on 1/2023 AND 3/2023 and both showed no AF recurrence. \par - After counseling, we agreed  to c/w aspirin 81 mg daily. Has concerns about the copays of eliquis. \par - One week MCOT in on 8/2023 then follow up\par - Advised her to measure her BP frequently and discuss this with her cardiologist. \par \par \par Currently asymptomatic from SB, will monitor using the periodic MCOT and her Apple watch.\par

## 2023-04-28 NOTE — HISTORY OF PRESENT ILLNESS
[FreeTextEntry1] : This is a 63 year old Female with PMH of prior HTN (off medication now), HLD, hypothyroidism, sinus bradycardia and symptomatic paroxysmal atrial fibrillation. She presented to [facility] on 2021 with near syncope and was found to be bradycardic (40s) and hypotensive (60s/40s). Her ARB was discontinued. She followed up with Dr. Jama at [facility] and had a stress test which showed good response to exercise; negative for ischemia. A one month cardiac monitor revealed new onset AF (longest episode 93 min and overall burden 0.2%). She was referred to me in the [facility] office for further management and started on ASA ([id number] =1). In 2022, she felt sudden very bothersome palpitations lasting ~ 5 hours which correlated with AF w/ RVR. After deliberation, she underwent an AF ablation with me on 22 (RFA-PVI). \par \par 10/3/2022:\par She is here for follow up. She was not given any prior follow up. She took all medications as instructed and is still on eliquis. She had few brief (10-20 seconds) of chest tightness in the first month after ablation which resolved. Denies any groin bleeding or swelling. She denies any fevers, chills, cough, sweats, chest pain, palpitations, dyspnea on exertion, orthopnea, paroxysmal nocturnal dyspnea, peripheral edema, lightheadedness, dizziness, syncope, nausea, vomiting, melena, hematochezia, or hematemesis.\par \par 2023:\par Since last visit, she remained on eliquis and had one week MCOT earlier this month which showed SR with no AF or prolonged bradycardia or symptoms. She denies any fevers, chills, cough, sweats, chest pain, palpitations, dyspnea on exertion, orthopnea, paroxysmal nocturnal dyspnea, peripheral edema, lightheadedness, dizziness, syncope, nausea, vomiting, melena, hematochezia, or hematemesis. She stopped taking eliquis last week and has been taking aspirin now. No AF alerts on her Apple watch which she uses daily. \par \par 23:\par Eliquis changed to aspirin in last visit. She is doing well with no s/s of recurrent AF. Compliant with meds with no bleeding. HAd repeat MCOT on 3/2023 with no recurrent AF. Had repeat echo which showed normal EF. \par \par Social history:\par Negative for smoking, illicit drugs or alcohol abuse.\par \par Family history:\par Father  in a park without clear cause. He was alcoholic. Negative for premature CAD or SCD. \par \par TESTS: \par EKG 23: SB @ 50 BPM\par EKG 2023: SB @ 56 bpm, with old inferior Q waves\par EKG 10/3/22: Sinus  Bradycardia. Poor R wave progression. \par EK2022; Sinus bradycardia \par \par MCOT 2023: SR with average HR 60 (). No symptoms or AF or significant bradycardia. \par Echo 2021; Summary:\par  1. Left ventricular ejection fraction, by visual estimation, is 65 to 70%.\par  2. Normal global left ventricular systolic function.\par  3. Diastolic function indeterminate.\par  4. Mildly enlarged right ventricle with normal systolic function, inadequate estimation of RVSP.\par  5. Mildly enlarged left atrium.\par  6. Mild mitral valve regurgitation.\par  7. Mild tricuspid regurgitation.\par  8.Mobile intra-atrial septum.\par  9. Trivial pericardial effusion.\par \par \par STRESS TEST 2021 IMPRESSIONS:\par Exercise capacity: 8 METS, Average for age and gender. 85%of MPHR.\par Chest Pain: No chest pain with exercise.\par Symptom: Shortness of breath.\par HR Response: Appropriate.\par BP Response: Appropriate.\par Heart Rhythm: Sinus Bradycardia - 55 BPM.\par ECG Abnormalities: There were no diagnostic changes.\par Arrhythmia: Occasional APC's.\par Rivas Treadmill Score: 8

## 2023-05-12 ENCOUNTER — NON-APPOINTMENT (OUTPATIENT)
Age: 63
End: 2023-05-12

## 2023-09-18 ENCOUNTER — APPOINTMENT (OUTPATIENT)
Dept: ELECTROPHYSIOLOGY | Facility: CLINIC | Age: 63
End: 2023-09-18
Payer: COMMERCIAL

## 2023-09-18 ENCOUNTER — NON-APPOINTMENT (OUTPATIENT)
Age: 63
End: 2023-09-18

## 2023-09-18 VITALS
WEIGHT: 158 LBS | HEART RATE: 51 BPM | SYSTOLIC BLOOD PRESSURE: 128 MMHG | BODY MASS INDEX: 25.39 KG/M2 | HEIGHT: 66 IN | DIASTOLIC BLOOD PRESSURE: 71 MMHG | OXYGEN SATURATION: 97 %

## 2023-09-18 DIAGNOSIS — Z98.890 OTHER SPECIFIED POSTPROCEDURAL STATES: ICD-10-CM

## 2023-09-18 DIAGNOSIS — I48.0 PAROXYSMAL ATRIAL FIBRILLATION: ICD-10-CM

## 2023-09-18 DIAGNOSIS — Z86.79 OTHER SPECIFIED POSTPROCEDURAL STATES: ICD-10-CM

## 2023-09-18 PROCEDURE — 99214 OFFICE O/P EST MOD 30 MIN: CPT | Mod: 25

## 2023-09-18 PROCEDURE — 93000 ELECTROCARDIOGRAM COMPLETE: CPT

## 2023-09-19 ENCOUNTER — OFFICE (OUTPATIENT)
Dept: URBAN - METROPOLITAN AREA CLINIC 63 | Facility: CLINIC | Age: 63
Setting detail: OPHTHALMOLOGY
End: 2023-09-19
Payer: MEDICAID

## 2023-09-19 DIAGNOSIS — H25.13: ICD-10-CM

## 2023-09-19 DIAGNOSIS — H11.153: ICD-10-CM

## 2023-09-19 DIAGNOSIS — H35.033: ICD-10-CM

## 2023-09-19 DIAGNOSIS — H43.393: ICD-10-CM

## 2023-09-19 PROCEDURE — 92250 FUNDUS PHOTOGRAPHY W/I&R: CPT | Performed by: STUDENT IN AN ORGANIZED HEALTH CARE EDUCATION/TRAINING PROGRAM

## 2023-09-19 PROCEDURE — 92014 COMPRE OPH EXAM EST PT 1/>: CPT | Performed by: STUDENT IN AN ORGANIZED HEALTH CARE EDUCATION/TRAINING PROGRAM

## 2023-09-19 ASSESSMENT — REFRACTION_MANIFEST
OS_ADD: +2.25
OD_SPHERE: +0.75
OS_VA1: 20/20
OD_ADD: +2.25
OS_ADD: +2.50
OS_AXIS: 0.00
OD_SPHERE: +0.50
OS_SPHERE: +0.25
OD_ADD: +2.50
OD_VA1: 20/20
OD_VA1: 20/20
OS_SPHERE: +0.50
OS_CYLINDER: 0.00
OD_CYLINDER: -0.75
OD_CYLINDER: -0.50
OD_AXIS: 075
OD_AXIS: 75
OS_VA1: 20/20

## 2023-09-19 ASSESSMENT — SPHEQUIV_DERIVED
OS_SPHEQUIV: 0.5
OD_SPHEQUIV: 0.25
OD_SPHEQUIV: 0.375
OS_SPHEQUIV: 0.875
OD_SPHEQUIV: 1.25

## 2023-09-19 ASSESSMENT — VISUAL ACUITY
OS_BCVA: 20/30
OD_BCVA: 20/25-1

## 2023-09-19 ASSESSMENT — KERATOMETRY
OS_AXISANGLE_DEGREES: 043
OS_K2POWER_DIOPTERS: 45.75
OS_K1POWER_DIOPTERS: 45.50
OD_AXISANGLE_DEGREES: 160
OD_K1POWER_DIOPTERS: 44.75
OD_K2POWER_DIOPTERS: 45.25

## 2023-09-19 ASSESSMENT — REFRACTION_AUTOREFRACTION
OD_CYLINDER: -1.00
OD_AXIS: 084
OS_SPHERE: +1.00
OS_AXIS: 069
OS_CYLINDER: -0.25
OD_SPHERE: +1.75

## 2023-09-19 ASSESSMENT — AXIALLENGTH_DERIVED
OD_AL: 22.9609
OD_AL: 22.5977
OS_AL: 22.5214
OD_AL: 22.9148
OS_AL: 22.6553

## 2023-09-19 ASSESSMENT — REFRACTION_CURRENTRX
OS_SPHERE: +2.50
OS_OVR_VA: 20/
OD_OVR_VA: 20/
OD_SPHERE: +2.50

## 2023-09-19 ASSESSMENT — TONOMETRY
OS_IOP_MMHG: 18
OD_IOP_MMHG: 17

## 2023-09-19 ASSESSMENT — CONFRONTATIONAL VISUAL FIELD TEST (CVF)
OD_FINDINGS: FULL
OS_FINDINGS: FULL

## 2023-11-20 NOTE — H&P ADULT - NSHPREVIEWOFSYSTEMS_GEN_ALL_CORE
REVIEW OF SYSTEMS:    CONSTITUTIONAL:  No weakness, fevers or chills  EYES/ENT:  No visual changes;  No vertigo or throat pain   NECK:  No pain or stiffness  RESPIRATORY:  No cough, wheezing, hemoptysis; No shortness of breath; IS 1000  CARDIOVASCULAR:  No chest pain or palpitations  GASTROINTESTINAL:  No abdominal or epigastric pain. No nausea, vomiting, or hematemesis; No diarrhea or constipation. No melena or hematochezia.  GENITOURINARY:  No dysuria, frequency or hematuria  MUSCULOSKELETAL:  FROM all extremities, normal strength, No calf tenderness  NEUROLOGICAL:  No numbness or weakness  SKIN:  No itching, rashes Patient

## 2023-12-14 NOTE — H&P PST ADULT - AS BP NONINV METHOD
ambulatory
[FreeTextEntry3] : I personally saw and examined the patient in detail. I spoke to WILBER Estrada regarding the assessment and plan of care.  I preformed the procedures and I reviewed the above assessment and plan of care, and agree. I have made changes in changes in the body of the note where appropriate.
auscultated w/ stethoscope

## 2024-02-06 ENCOUNTER — OUTPATIENT (OUTPATIENT)
Dept: OUTPATIENT SERVICES | Facility: HOSPITAL | Age: 64
LOS: 1 days | End: 2024-02-06
Payer: COMMERCIAL

## 2024-02-06 DIAGNOSIS — Z01.810 ENCOUNTER FOR PREPROCEDURAL CARDIOVASCULAR EXAMINATION: ICD-10-CM

## 2024-02-06 DIAGNOSIS — Z98.890 OTHER SPECIFIED POSTPROCEDURAL STATES: Chronic | ICD-10-CM

## 2024-02-06 DIAGNOSIS — K21.9 GASTRO-ESOPHAGEAL REFLUX DISEASE WITHOUT ESOPHAGITIS: Chronic | ICD-10-CM

## 2024-02-06 DIAGNOSIS — Z90.49 ACQUIRED ABSENCE OF OTHER SPECIFIED PARTS OF DIGESTIVE TRACT: Chronic | ICD-10-CM

## 2024-02-06 DIAGNOSIS — R01.1 CARDIAC MURMUR, UNSPECIFIED: ICD-10-CM

## 2024-02-06 PROCEDURE — 93017 CV STRESS TEST TRACING ONLY: CPT

## 2024-02-06 PROCEDURE — 93018 CV STRESS TEST I&R ONLY: CPT

## 2024-02-06 PROCEDURE — 93016 CV STRESS TEST SUPVJ ONLY: CPT

## 2024-02-20 ENCOUNTER — RESULT REVIEW (OUTPATIENT)
Age: 64
End: 2024-02-20

## 2024-02-20 ENCOUNTER — OUTPATIENT (OUTPATIENT)
Dept: OUTPATIENT SERVICES | Facility: HOSPITAL | Age: 64
LOS: 1 days | End: 2024-02-20
Payer: COMMERCIAL

## 2024-02-20 DIAGNOSIS — Z01.810 ENCOUNTER FOR PREPROCEDURAL CARDIOVASCULAR EXAMINATION: ICD-10-CM

## 2024-02-20 DIAGNOSIS — Z90.49 ACQUIRED ABSENCE OF OTHER SPECIFIED PARTS OF DIGESTIVE TRACT: Chronic | ICD-10-CM

## 2024-02-20 DIAGNOSIS — R94.31 ABNORMAL ELECTROCARDIOGRAM [ECG] [EKG]: ICD-10-CM

## 2024-02-20 DIAGNOSIS — Z98.890 OTHER SPECIFIED POSTPROCEDURAL STATES: Chronic | ICD-10-CM

## 2024-02-20 DIAGNOSIS — K21.9 GASTRO-ESOPHAGEAL REFLUX DISEASE WITHOUT ESOPHAGITIS: Chronic | ICD-10-CM

## 2024-02-20 PROCEDURE — A9500: CPT

## 2024-02-20 PROCEDURE — 78452 HT MUSCLE IMAGE SPECT MULT: CPT | Mod: 26,ME

## 2024-02-20 PROCEDURE — G1004: CPT

## 2024-02-20 PROCEDURE — 78452 HT MUSCLE IMAGE SPECT MULT: CPT | Mod: ME

## 2024-02-20 PROCEDURE — 93016 CV STRESS TEST SUPVJ ONLY: CPT | Mod: ME

## 2024-02-20 PROCEDURE — 93017 CV STRESS TEST TRACING ONLY: CPT

## 2024-02-20 PROCEDURE — 93018 CV STRESS TEST I&R ONLY: CPT | Mod: ME

## 2024-05-22 NOTE — DISCHARGE NOTE NURSING/CASE MANAGEMENT/SOCIAL WORK - NSTOBACCONEVERSMOKERY/N_GEN_A
"Chief Complaint   Patient presents with    Consult     Congenital abnormality of ear.       Vitals:    05/22/24 1336   BP: 115/77   BP Location: Right arm   Patient Position: Sitting   Cuff Size: Adult Regular   Pulse: 79   SpO2: 96%   Weight: 85 kg (187 lb 6.4 oz)   Height: 1.66 m (5' 5.35\")       Body mass index is 30.85 kg/m .    Adi Anton EMT    " Yes

## 2024-09-27 ENCOUNTER — OFFICE (OUTPATIENT)
Dept: URBAN - METROPOLITAN AREA CLINIC 63 | Facility: CLINIC | Age: 64
Setting detail: OPHTHALMOLOGY
End: 2024-09-27
Payer: MEDICAID

## 2024-09-27 DIAGNOSIS — H43.393: ICD-10-CM

## 2024-09-27 DIAGNOSIS — H11.153: ICD-10-CM

## 2024-09-27 DIAGNOSIS — H25.13: ICD-10-CM

## 2024-09-27 DIAGNOSIS — H35.033: ICD-10-CM

## 2024-09-27 PROCEDURE — 92014 COMPRE OPH EXAM EST PT 1/>: CPT | Performed by: STUDENT IN AN ORGANIZED HEALTH CARE EDUCATION/TRAINING PROGRAM

## 2024-09-27 PROCEDURE — 92250 FUNDUS PHOTOGRAPHY W/I&R: CPT | Performed by: STUDENT IN AN ORGANIZED HEALTH CARE EDUCATION/TRAINING PROGRAM

## 2024-09-27 ASSESSMENT — CONFRONTATIONAL VISUAL FIELD TEST (CVF)
OD_FINDINGS: FULL
OS_FINDINGS: FULL